# Patient Record
Sex: MALE | Race: WHITE | NOT HISPANIC OR LATINO | Employment: OTHER | URBAN - METROPOLITAN AREA
[De-identification: names, ages, dates, MRNs, and addresses within clinical notes are randomized per-mention and may not be internally consistent; named-entity substitution may affect disease eponyms.]

---

## 2017-03-21 ENCOUNTER — TELEPHONE (OUTPATIENT)
Dept: MEDICAL GROUP | Facility: MEDICAL CENTER | Age: 67
End: 2017-03-21

## 2017-03-21 NOTE — TELEPHONE ENCOUNTER
Left message for patient to call back regarding NP pre-visit planning. Please transfer call to [phone].  NP packet emailed to patient

## 2017-03-31 ENCOUNTER — OFFICE VISIT (OUTPATIENT)
Dept: MEDICAL GROUP | Facility: MEDICAL CENTER | Age: 67
End: 2017-03-31
Payer: MEDICARE

## 2017-03-31 VITALS
TEMPERATURE: 97.2 F | HEIGHT: 72 IN | DIASTOLIC BLOOD PRESSURE: 64 MMHG | RESPIRATION RATE: 16 BRPM | BODY MASS INDEX: 28.17 KG/M2 | HEART RATE: 64 BPM | SYSTOLIC BLOOD PRESSURE: 122 MMHG | OXYGEN SATURATION: 94 % | WEIGHT: 208 LBS

## 2017-03-31 DIAGNOSIS — N52.01 ERECTILE DYSFUNCTION DUE TO ARTERIAL INSUFFICIENCY: ICD-10-CM

## 2017-03-31 DIAGNOSIS — Z85.828 HISTORY OF SKIN CANCER: ICD-10-CM

## 2017-03-31 DIAGNOSIS — H91.93 HEARING LOSS, BILATERAL: ICD-10-CM

## 2017-03-31 DIAGNOSIS — E78.5 DYSLIPIDEMIA: ICD-10-CM

## 2017-03-31 DIAGNOSIS — Z12.5 SCREENING PSA (PROSTATE SPECIFIC ANTIGEN): ICD-10-CM

## 2017-03-31 DIAGNOSIS — Z23 NEED FOR PNEUMOCOCCAL VACCINE: ICD-10-CM

## 2017-03-31 DIAGNOSIS — R20.0 NUMBNESS OF FOOT: ICD-10-CM

## 2017-03-31 DIAGNOSIS — I49.9 IRREGULAR HEART RATE: ICD-10-CM

## 2017-03-31 PROCEDURE — 4040F PNEUMOC VAC/ADMIN/RCVD: CPT | Performed by: INTERNAL MEDICINE

## 2017-03-31 PROCEDURE — G8484 FLU IMMUNIZE NO ADMIN: HCPCS | Performed by: INTERNAL MEDICINE

## 2017-03-31 PROCEDURE — G8419 CALC BMI OUT NRM PARAM NOF/U: HCPCS | Performed by: INTERNAL MEDICINE

## 2017-03-31 PROCEDURE — 90670 PCV13 VACCINE IM: CPT | Performed by: INTERNAL MEDICINE

## 2017-03-31 PROCEDURE — 99204 OFFICE O/P NEW MOD 45 MIN: CPT | Mod: 25 | Performed by: INTERNAL MEDICINE

## 2017-03-31 PROCEDURE — 1101F PT FALLS ASSESS-DOCD LE1/YR: CPT | Performed by: INTERNAL MEDICINE

## 2017-03-31 PROCEDURE — G0009 ADMIN PNEUMOCOCCAL VACCINE: HCPCS | Performed by: INTERNAL MEDICINE

## 2017-03-31 PROCEDURE — 93000 ELECTROCARDIOGRAM COMPLETE: CPT | Performed by: INTERNAL MEDICINE

## 2017-03-31 PROCEDURE — G8432 DEP SCR NOT DOC, RNG: HCPCS | Performed by: INTERNAL MEDICINE

## 2017-03-31 PROCEDURE — 3017F COLORECTAL CA SCREEN DOC REV: CPT | Performed by: INTERNAL MEDICINE

## 2017-03-31 PROCEDURE — 1036F TOBACCO NON-USER: CPT | Performed by: INTERNAL MEDICINE

## 2017-03-31 ASSESSMENT — PATIENT HEALTH QUESTIONNAIRE - PHQ9: CLINICAL INTERPRETATION OF PHQ2 SCORE: 0

## 2017-03-31 NOTE — MR AVS SNAPSHOT
Tato Benjamin   3/31/2017 11:00 AM   Office Visit   MRN: [id number]    Department:  [address] [place] [address]   Dept Phone:  [phone]    Description:  Male : 1950   Provider:  Shravan Trent M.D.           Reason for Visit     Establish Care Hearing issues    Referral Needed ENT      Allergies as of 3/31/2017     No Known Allergies      You were diagnosed with     Dyslipidemia   [946439]       History of skin cancer   [678694]       Hearing loss, bilateral   [613381]       Screening PSA (prostate specific antigen)   [064278]       Numbness of foot   [619245]       Erectile dysfunction due to arterial insufficiency   [264735]       Need for pneumococcal vaccine   [679454]       Irregular heart rate   [838189]         Vital Signs     Blood Pressure Pulse Temperature Respirations Height Weight    122/64 mmHg 64 36.2 °C (97.2 °F) 16 1.829 m (6') 94.348 kg (208 lb)    Body Mass Index Oxygen Saturation Smoking Status             28.20 kg/m2 94% Never Smoker          Basic Information     Date Of Birth Sex Race Ethnicity Preferred Language    1950 Male White Non- English      Your appointments     2017  9:20 AM   Established Patient with Shravan Trent M.D.   [facility] [address]    [address] [facility] [address]  [facility] [address] [phone]           You will be receiving a confirmation call a few days before your appointment from our automated call confirmation system.              Problem List              ICD-10-CM Priority Class Noted - Resolved    Dyslipidemia E78.5   3/31/2017 - Present    History of skin cancer Z85.828   3/31/2017 - Present    Erectile dysfunction due to arterial insufficiency N52.01   3/31/2017 - Present    Numbness of foot R20.0   3/31/2017 - Present      Health Maintenance        Date Due Completion Dates    IMM ZOSTER VACCINE 2008 ---    IMM PNEUMOCOCCAL 65+ (ADULT) LOW/MEDIUM RISK SERIES (1 of 2 - PCV13) 2013 ---    IMM  INFLUENZA (1) 9/1/2016 9/30/2013    IMM DTaP/Tdap/Td Vaccine (2 - Td) 2/28/2022 2/29/2012    COLONOSCOPY 3/12/2024 3/12/2014 (Done)    Override on 3/12/2014: Done            Current Immunizations     13-VALENT PCV PREVNAR  Incomplete    Influenza Vaccine Quad Inj (Pf) 9/30/2013    Tdap Vaccine 2/29/2012      Below and/or attached are the medications your provider expects you to take. Review all of your home medications and newly ordered medications with your provider and/or pharmacist. Follow medication instructions as directed by your provider and/or pharmacist. Please keep your medication list with you and share with your provider. Update the information when medications are discontinued, doses are changed, or new medications (including over-the-counter products) are added; and carry medication information at all times in the event of emergency situations     Allergies:  No Known Allergies          Medications  Valid as of: March 31, 2017 - 11:51 AM    Generic Name Brand Name Tablet Size Instructions for use    Acetaminophen (Tab) TYLENOL 500 MG Take 500-1,000 mg by mouth every 6 hours as needed.        Aspirin (Tab) aspirin 81 MG Take 81 mg by mouth every day.        Coenzyme Q10   Take 75 mg by mouth every day.        Evening Primrose Oil (Cap) Evening Primrose Oil 500 MG Take  by mouth every day.        Multiple Vitamins-Minerals   Take  by mouth every day.        Omega-3 Fatty Acids (Cap) Fish Oil 1200 MG Take  by mouth every day.        Psyllium   Take  by mouth every day.        Simvastatin (Tab) ZOCOR 40 MG Take 40 mg by mouth every evening.        .                 Medicines prescribed today were sent to:     [facility]/PHARMACY #[id number] - [place] - [address]    [address] [place] [address]    Phone: [phone] Fax: [phone]    Open 24 Hours?: No      Medication refill instructions:       If your prescription bottle indicates you have medication refills left, it is not necessary to call your  provider’s office. Please contact your pharmacy and they will refill your medication.    If your prescription bottle indicates you do not have any refills left, you may request refills at any time through one of the following ways: The online [employer] system (except Urgent Care), by calling your provider’s office, or by asking your pharmacy to contact your provider’s office with a refill request. Medication refills are processed only during regular business hours and may not be available until the next business day. Your provider may request additional information or to have a follow-up visit with you prior to refilling your medication.   *Please Note: Medication refills are assigned a new Rx number when refilled electronically. Your pharmacy may indicate that no refills were authorized even though a new prescription for the same medication is available at the pharmacy. Please request the medicine by name with the pharmacy before contacting your provider for a refill.        Your To Do List     Future Labs/Procedures Complete By Expires    COMP METABOLIC PANEL  As directed 4/1/2018    FOLATE  As directed 4/1/2018    LIPID PROFILE  As directed 4/1/2018    PROSTATE SPECIFIC AG SCREENING  As directed 4/1/2018    TSH  As directed 4/1/2018    VITAMIN B12  As directed 4/1/2018      Referral     A referral request has been sent to our patient care coordination department. Please allow 3-5 business days for us to process this request and contact you either by phone or mail. If you do not hear from us by the 5th business day, please call us at [phone].           [employer] Access Code: Activation code not generated  Current [employer] Status: Active

## 2017-03-31 NOTE — PROGRESS NOTES
CC: Establishing care multiple issues    HPI:   Tato presents today with the following.    1. Dyslipidemia  Family history of coronary artery disease maintained on a statin not had blood work in over 2 years.    2. History of skin cancer  Distant past with no recurrence denying any skin changes.    3. Hearing loss, bilateral  Reports bilateral hearing loss but a sudden onset of decreased hearing in one side. He was evaluated by an audiologist but because the rapid decrease was suggested he be screened further. He denies any headaches or blurred vision but does report a decreased sense of smell. He does have some baseline allergies. Wife also reports severe bad breath without symptoms of reflux.    4. Screening PSA (prostate specific antigen)  He would like to have screening because of family history his father.    5. Numbness of foot  He does complain of mild bilateral foot numbness. Has not progressed since been present for several years. He still has feeling but has lost some light touch. Denies any pain in his legs with ambulation.    6. Erectile dysfunction due to arterial insufficiency  He has tried Viagra in the past which is been helpful. Reports libido is maintained and no depressive symptoms. Denies any chest pain or shortness breath.    7. Need for pneumococcal vaccine      8. Irregular heart rate  Heart rate slightly irregular on exam and slow but again no chest pain or palpitations.      Patient Active Problem List    Diagnosis Date Noted   • Dyslipidemia 03/31/2017   • History of skin cancer 03/31/2017   • Erectile dysfunction due to arterial insufficiency 03/31/2017   • Numbness of foot 03/31/2017       Current Outpatient Prescriptions   Medication Sig Dispense Refill   • simvastatin (ZOCOR) 40 MG TABS Take 40 mg by mouth every evening.     • aspirin 81 MG tablet Take 81 mg by mouth every day.     • Omega-3 Fatty Acids (FISH OIL) 1200 MG CAPS Take  by mouth every day.     • Coenzyme Q10 (COQ10 PO)  Take 75 mg by mouth every day.     • Multiple Vitamins-Minerals (PRESERVISION AREDS 2 PO) Take  by mouth every day.     • Evening Primrose Oil 500 MG CAPS Take  by mouth every day.     • acetaminophen (TYLENOL) 500 MG TABS Take 500-1,000 mg by mouth every 6 hours as needed.     • Psyllium (METAMUCIL PO) Take  by mouth every day.       No current facility-administered medications for this visit.         Allergies as of 03/31/2017   • (No Known Allergies)        ROS: As per HPI.    /64 mmHg  Pulse 64  Temp(Src) 36.2 °C (97.2 °F)  Resp 16  Ht 1.829 m (6')  Wt 94.348 kg (208 lb)  BMI 28.20 kg/m2  SpO2 94%    Physical Exam:  Gen:         Alert and oriented, No apparent distress.  Neck:        No Lymphadenopathy or Bruits.  Lungs:     Clear to auscultation bilaterally  CV: Bradycardic and normal rhythm. No murmurs, rubs or gallops.               Ext:          No clubbing, cyanosis, edema.    EKG:   sinus bradycardia, no acute st-t wave changes.Assessment and Plan.   66 y.o. male with the following issues.    1. Dyslipidemia  Sending for recheck of cholesterol likely switch to Lipitor at next visit.  - COMP METABOLIC PANEL; Future  - LIPID PROFILE; Future    2. History of skin cancer  No further recurrence follow expectantly.    3. Hearing loss, bilateral  Given the sudden nature as well as decreased smell have referred to ENT.  - REFERRAL TO ENT    4. Screening PSA (prostate specific antigen)    - PROSTATE SPECIFIC AG SCREENING; Future    5. Numbness of foot  Setting for additional blood work.  - TSH; Future  - VITAMIN B12; Future  - FOLATE; Future    6. Erectile dysfunction due to arterial insufficiency  We'll await results of blood work he will investigate price considerations for prescription medications as Viagra is been helpful in the past will give him prescription.    7. Need for pneumococcal vaccine    - PNEUMOCOCCAL CONJUGATE VACCINE 13-VALENT    8. Irregular heart rate  Irregular on exam but EKG  shows sinus bradycardia with no irregularities.  - EKG

## 2017-04-05 ENCOUNTER — HOSPITAL ENCOUNTER (OUTPATIENT)
Dept: LAB | Facility: MEDICAL CENTER | Age: 67
End: 2017-04-05
Attending: INTERNAL MEDICINE
Payer: MEDICARE

## 2017-04-05 DIAGNOSIS — R20.0 NUMBNESS OF FOOT: ICD-10-CM

## 2017-04-05 DIAGNOSIS — Z12.5 SCREENING PSA (PROSTATE SPECIFIC ANTIGEN): ICD-10-CM

## 2017-04-05 DIAGNOSIS — E78.5 DYSLIPIDEMIA: ICD-10-CM

## 2017-04-05 LAB
ALBUMIN SERPL BCP-MCNC: 4.2 G/DL (ref 3.2–4.9)
ALBUMIN/GLOB SERPL: 1.4 G/DL
ALP SERPL-CCNC: 75 U/L (ref 30–99)
ALT SERPL-CCNC: 16 U/L (ref 2–50)
ANION GAP SERPL CALC-SCNC: 7 MMOL/L (ref 0–11.9)
AST SERPL-CCNC: 19 U/L (ref 12–45)
BILIRUB SERPL-MCNC: 0.8 MG/DL (ref 0.1–1.5)
BUN SERPL-MCNC: 20 MG/DL (ref 8–22)
CALCIUM SERPL-MCNC: 9 MG/DL (ref 8.5–10.5)
CHLORIDE SERPL-SCNC: 106 MMOL/L (ref 96–112)
CHOLEST SERPL-MCNC: 204 MG/DL (ref 100–199)
CO2 SERPL-SCNC: 24 MMOL/L (ref 20–33)
CREAT SERPL-MCNC: 0.82 MG/DL (ref 0.5–1.4)
FOLATE SERPL-MCNC: 14.7 NG/ML
GFR SERPL CREATININE-BSD FRML MDRD: >60 ML/MIN/1.73 M 2
GLOBULIN SER CALC-MCNC: 2.9 G/DL (ref 1.9–3.5)
GLUCOSE SERPL-MCNC: 93 MG/DL (ref 65–99)
HDLC SERPL-MCNC: 36 MG/DL
LDLC SERPL CALC-MCNC: 137 MG/DL
POTASSIUM SERPL-SCNC: 4.2 MMOL/L (ref 3.6–5.5)
PROT SERPL-MCNC: 7.1 G/DL (ref 6–8.2)
PSA SERPL-MCNC: 3.41 NG/ML (ref 0–4)
SODIUM SERPL-SCNC: 137 MMOL/L (ref 135–145)
TRIGL SERPL-MCNC: 157 MG/DL (ref 0–149)
TSH SERPL DL<=0.005 MIU/L-ACNC: 1.75 UIU/ML (ref 0.3–3.7)
VIT B12 SERPL-MCNC: 173 PG/ML (ref 211–911)

## 2017-04-05 PROCEDURE — 84443 ASSAY THYROID STIM HORMONE: CPT

## 2017-04-05 PROCEDURE — 80053 COMPREHEN METABOLIC PANEL: CPT

## 2017-04-05 PROCEDURE — 82746 ASSAY OF FOLIC ACID SERUM: CPT

## 2017-04-05 PROCEDURE — 80061 LIPID PANEL: CPT

## 2017-04-05 PROCEDURE — 36415 COLL VENOUS BLD VENIPUNCTURE: CPT

## 2017-04-05 PROCEDURE — 84153 ASSAY OF PSA TOTAL: CPT | Mod: GA

## 2017-04-05 PROCEDURE — 82607 VITAMIN B-12: CPT

## 2017-04-12 ENCOUNTER — TELEPHONE (OUTPATIENT)
Dept: MEDICAL GROUP | Facility: MEDICAL CENTER | Age: 67
End: 2017-04-12

## 2017-04-12 ENCOUNTER — OFFICE VISIT (OUTPATIENT)
Dept: MEDICAL GROUP | Facility: MEDICAL CENTER | Age: 67
End: 2017-04-12
Payer: MEDICARE

## 2017-04-12 VITALS
BODY MASS INDEX: 28.17 KG/M2 | OXYGEN SATURATION: 96 % | WEIGHT: 208 LBS | TEMPERATURE: 98.7 F | HEIGHT: 72 IN | RESPIRATION RATE: 16 BRPM | HEART RATE: 74 BPM | SYSTOLIC BLOOD PRESSURE: 122 MMHG | DIASTOLIC BLOOD PRESSURE: 78 MMHG

## 2017-04-12 DIAGNOSIS — Z00.00 INITIAL MEDICARE ANNUAL WELLNESS VISIT: ICD-10-CM

## 2017-04-12 DIAGNOSIS — Z85.828 HISTORY OF SKIN CANCER: ICD-10-CM

## 2017-04-12 DIAGNOSIS — E78.5 DYSLIPIDEMIA: ICD-10-CM

## 2017-04-12 DIAGNOSIS — E53.8 B12 DEFICIENCY: ICD-10-CM

## 2017-04-12 DIAGNOSIS — N52.01 ERECTILE DYSFUNCTION DUE TO ARTERIAL INSUFFICIENCY: ICD-10-CM

## 2017-04-12 PROBLEM — R20.0 NUMBNESS OF FOOT: Status: RESOLVED | Noted: 2017-03-31 | Resolved: 2017-04-12

## 2017-04-12 PROCEDURE — 4040F PNEUMOC VAC/ADMIN/RCVD: CPT | Performed by: INTERNAL MEDICINE

## 2017-04-12 PROCEDURE — 3017F COLORECTAL CA SCREEN DOC REV: CPT | Performed by: INTERNAL MEDICINE

## 2017-04-12 PROCEDURE — 96372 THER/PROPH/DIAG INJ SC/IM: CPT | Performed by: INTERNAL MEDICINE

## 2017-04-12 PROCEDURE — 99214 OFFICE O/P EST MOD 30 MIN: CPT | Mod: 25 | Performed by: INTERNAL MEDICINE

## 2017-04-12 PROCEDURE — G0439 PPPS, SUBSEQ VISIT: HCPCS | Performed by: INTERNAL MEDICINE

## 2017-04-12 PROCEDURE — 1101F PT FALLS ASSESS-DOCD LE1/YR: CPT | Performed by: INTERNAL MEDICINE

## 2017-04-12 PROCEDURE — G8419 CALC BMI OUT NRM PARAM NOF/U: HCPCS | Performed by: INTERNAL MEDICINE

## 2017-04-12 PROCEDURE — G8510 SCR DEP NEG, NO PLAN REQD: HCPCS | Performed by: INTERNAL MEDICINE

## 2017-04-12 PROCEDURE — 1036F TOBACCO NON-USER: CPT | Performed by: INTERNAL MEDICINE

## 2017-04-12 RX ORDER — UBIDECARENONE 75 MG
100 CAPSULE ORAL DAILY
Qty: 30 TAB | Refills: 6 | Status: SHIPPED | OUTPATIENT
Start: 2017-04-12 | End: 2017-04-12

## 2017-04-12 RX ORDER — CYANOCOBALAMIN 1000 UG/ML
1000 INJECTION, SOLUTION INTRAMUSCULAR; SUBCUTANEOUS ONCE
Status: COMPLETED | OUTPATIENT
Start: 2017-04-12 | End: 2017-04-12

## 2017-04-12 RX ORDER — ATORVASTATIN CALCIUM 40 MG/1
40 TABLET, FILM COATED ORAL DAILY
Qty: 90 TAB | Refills: 3 | Status: SHIPPED | OUTPATIENT
Start: 2017-04-12 | End: 2018-03-03 | Stop reason: SDUPTHER

## 2017-04-12 RX ORDER — SILDENAFIL 100 MG/1
100 TABLET, FILM COATED ORAL PRN
Qty: 18 TAB | Refills: 3 | Status: SHIPPED
Start: 2017-04-12 | End: 2020-10-07 | Stop reason: SDUPTHER

## 2017-04-12 RX ADMIN — CYANOCOBALAMIN 1000 MCG: 1000 INJECTION, SOLUTION INTRAMUSCULAR; SUBCUTANEOUS at 10:07

## 2017-04-12 ASSESSMENT — PATIENT HEALTH QUESTIONNAIRE - PHQ9: CLINICAL INTERPRETATION OF PHQ2 SCORE: 0

## 2017-04-12 NOTE — PROGRESS NOTES
CC: Follow-up blood work due for wellness examination.    HPI:   Tato presents today with the following.    1. Initial Medicare annual wellness visit  Screenings performed below reports has advanced directives at home and will bring a copy to office.    2. Dyslipidemia  Maintain on simvastatin cholesterol shows an LDL of 137 with an HDL of 39. He tolerates medication well without myalgias.    3. Erectile dysfunction due to arterial insufficiency  He does suffer from erectile dysfunction is interested in Viagra. Price is too expensive so would like to do a mail away. He denies any chest pain or shortness breath with activity and no edema.    4. History of skin cancer  Followed regularly by dermatology denies any new skin lesions 2.    5. B12 deficiency  Was complaining of some decreased sensation in the feet with light touch. He was sent for blood work and found to have a low B-12 at 173. He denies any GI issues and does not report any excessive fatigue.  - Cyanocobalamin      Depression Screening    Little interest or pleasure in doing things?  0 - not at all  Feeling down, depressed , or hopeless? 0 - not at all  Trouble falling or staying asleep, or sleeping too much?     Feeling tired or having little energy?     Poor appetite or overeating?     Feeling bad about yourself - or that you are a failure or have let yourself or your family down?    Trouble concentrating on things, such as reading the newspaper or watching television?    Moving or speaking so slowly that other people could have noticed.  Or the opposite - being so fidgety or restless that you have been moving around a lot more than usual?     Thoughts that you would be better off dead, or of hurting yourself?     Patient Health Questionnaire Score:    If depressive symptoms identified deferred to follow up visit unless specifically addressed in assessment and plan.      Screening for Cognitive Impairment    Three Minute Recall (banana, sunrise,  fence)  3/3    Draw clock face with all 12 numbers set to the hand to show 10 minures past 11 o'clock  1 5/5  If cognitive concerns identified deferred to follow up visit unless specifically addressed in assessment and plan.    Fall Risk Assessment    Has the patient had two or more falls in the last year or any fall with injury in the last year?  No  If Fall Risk identified deferred to follow up visit unless specifically addressed in assessment and plan.    Safety Assessment    Throw rugs on floor.  No  Handrails on all stairs.  No  Good lighting in all hallways.  Yes  Difficulty hearing.  Yes  Patient counseled about all safety risks that were identified.    Functional Assessment ADLs    Are there any barriers preventing you from cooking for yourself or meeting nutritional needs?  No.    Are there any barriers preventing you from driving safely or obtaining transportation?  No.    Are there any barriers preventing you from using a telephone or calling for help?  No.    Are there any barriers preventing you from shopping?  No.    Are there any barriers preventing you from taking care of your own finances?  No.    Are there any barriers preventing you from managing your medications?  No.    Are currently engaing any exercise or physical activity?  Yes.       Health Maintenance Summary                IMM ZOSTER VACCINE Overdue 12/21/2008     IMM PNEUMOCOCCAL 65+ (ADULT) LOW/MEDIUM RISK SERIES Next Due 3/31/2018      Done 3/31/2017 Imm Admin: Pneumococcal Conjugate Vaccine (Prevnar/PCV-13)    IMM DTaP/Tdap/Td Vaccine Next Due 2/28/2022      Done 2/29/2012 Imm Admin: Tdap Vaccine    COLONOSCOPY Next Due 3/12/2024      Done 3/12/2014           Patient Care Team:  Shravan Trent M.D. as PCP - General (Internal Medicine)  Ty Simons M.D. as Consulting Physician (Gastroenterology)  Sunita Jo M.D. as Consulting Physician (Dermatology)      Patient Active Problem List    Diagnosis Date Noted   • B12 deficiency  04/12/2017   • Dyslipidemia 03/31/2017   • History of skin cancer 03/31/2017   • Erectile dysfunction due to arterial insufficiency 03/31/2017       Current Outpatient Prescriptions   Medication Sig Dispense Refill   • cyanocobalamin (VITAMIN B-12) 100 MCG Tab Take 1 Tab by mouth every day. 30 Tab 6   • atorvastatin (LIPITOR) 40 MG Tab Take 1 Tab by mouth every day. 90 Tab 3   • sildenafil citrate (VIAGRA) 100 MG tablet Take 1 Tab by mouth as needed for Erectile Dysfunction. 18 Tab 3   • aspirin 81 MG tablet Take 81 mg by mouth every day.     • Omega-3 Fatty Acids (FISH OIL) 1200 MG CAPS Take  by mouth every day.     • Coenzyme Q10 (COQ10 PO) Take 75 mg by mouth every day.     • Multiple Vitamins-Minerals (PRESERVISION AREDS 2 PO) Take  by mouth every day.     • Evening Primrose Oil 500 MG CAPS Take  by mouth every day.     • acetaminophen (TYLENOL) 500 MG TABS Take 500-1,000 mg by mouth every 6 hours as needed.     • Psyllium (METAMUCIL PO) Take  by mouth every day.       Current Facility-Administered Medications   Medication Dose Route Frequency Provider Last Rate Last Dose   • cyanocobalamin (VITAMIN B-12) injection 1,000 mcg  1,000 mcg Intramuscular Once Shravan Trent M.D.             Allergies as of 04/12/2017   • (No Known Allergies)        ROS: As per HPI.    /78 mmHg  Pulse 74  Temp(Src) 37.1 °C (98.7 °F)  Resp 16  Ht 1.829 m (6')  Wt 94.348 kg (208 lb)  BMI 28.20 kg/m2  SpO2 96%    Physical Exam:  Gen:         Alert and oriented, No apparent distress.  Neck:        No Lymphadenopathy or Bruits.  Lungs:     Clear to auscultation bilaterally  CV:          Regular rate and rhythm. No murmurs, rubs or gallops.  Abd:         Soft non tender, non distended. Normal active bowel sounds.  No  Hepatosplenomegaly, No pulsatile masses.                   Ext:          No clubbing, cyanosis, edema.      Assessment and Plan.   66 y.o. male with the following issues.    1. Initial Medicare annual wellness  visit  Discussed healthy lifestyle habits as well as screening regimens.  - Initial Wellness Visit - Includes PPPS ()    2. Dyslipidemia  Switching to atorvastatin 40 mg recheck 3 months caution about side effects.  - atorvastatin (LIPITOR) 40 MG Tab; Take 1 Tab by mouth every day.  Dispense: 90 Tab; Refill: 3  - COMP METABOLIC PANEL; Future  - LIPID PROFILE; Future    3. Erectile dysfunction due to arterial insufficiency  Have given a prescription of Viagra. Cautioned about side effects  - sildenafil citrate (VIAGRA) 100 MG tablet; Take 1 Tab by mouth as needed for Erectile Dysfunction.  Dispense: 18 Tab; Refill: 3    4. History of skin cancer  Follow along with dermatology.  - Initial Wellness Visit - Includes PPPS ()    5. B12 deficiency  One time injection of B-12 and office have placed on oral replacement recheck in 3 months. If still low will turn to regular injections.  - VITAMIN B12; Future  - cyanocobalamin (VITAMIN B-12) 100 MCG Tab; Take 1 Tab by mouth every day.  Dispense: 30 Tab; Refill: 6  - cyanocobalamin (VITAMIN B-12) injection 1,000 mcg; 1 mL by Intramuscular route Once.

## 2017-04-12 NOTE — MR AVS SNAPSHOT
Tato BRY Benjamin   2017 9:20 AM   Office Visit   MRN: [id number]    Department:  [address] [place] [address]   Dept Phone:  [phone]    Description:  Male : 1950   Provider:  Shravan Trent M.D.           Reason for Visit     Annual Exam Labs      Allergies as of 2017     No Known Allergies      You were diagnosed with     Initial Medicare annual wellness visit   [5419024]       Dyslipidemia   [881171]       Erectile dysfunction due to arterial insufficiency   [103088]       History of skin cancer   [164615]       B12 deficiency   [817389]         Vital Signs     Blood Pressure Pulse Temperature Respirations Height Weight    122/78 mmHg 74 37.1 °C (98.7 °F) 16 1.829 m (6') 94.348 kg (208 lb)    Body Mass Index Oxygen Saturation Smoking Status             28.20 kg/m2 96% Never Smoker          Basic Information     Date Of Birth Sex Race Ethnicity Preferred Language    1950 Male White Non- English      Problem List              ICD-10-CM Priority Class Noted - Resolved    Dyslipidemia E78.5   3/31/2017 - Present    History of skin cancer Z85.828   3/31/2017 - Present    Erectile dysfunction due to arterial insufficiency N52.01   3/31/2017 - Present    B12 deficiency E53.8   2017 - Present      Health Maintenance        Date Due Completion Dates    IMM ZOSTER VACCINE 2008 ---    IMM PNEUMOCOCCAL 65+ (ADULT) LOW/MEDIUM RISK SERIES (2 of 2 - PPSV23) 3/31/2018 3/31/2017    IMM DTaP/Tdap/Td Vaccine (2 - Td) 2022    COLONOSCOPY 3/12/2024 3/12/2014 (Done)    Override on 3/12/2014: Done            Current Immunizations     13-VALENT PCV PREVNAR 3/31/2017 11:56 AM    Influenza Vaccine Quad Inj (Pf) 2013    Tdap Vaccine 2012      Below and/or attached are the medications your provider expects you to take. Review all of your home medications and newly ordered medications with your provider and/or pharmacist. Follow medication instructions as directed by your  provider and/or pharmacist. Please keep your medication list with you and share with your provider. Update the information when medications are discontinued, doses are changed, or new medications (including over-the-counter products) are added; and carry medication information at all times in the event of emergency situations     Allergies:  No Known Allergies          Medications  Valid as of: April 12, 2017 - 10:00 AM    Generic Name Brand Name Tablet Size Instructions for use    Acetaminophen (Tab) TYLENOL 500 MG Take 500-1,000 mg by mouth every 6 hours as needed.        Aspirin (Tab) aspirin 81 MG Take 81 mg by mouth every day.        Atorvastatin Calcium (Tab) LIPITOR 40 MG Take 1 Tab by mouth every day.        Coenzyme Q10   Take 75 mg by mouth every day.        Cyanocobalamin (Tab) VITAMIN B-12 100 MCG Take 1 Tab by mouth every day.        Evening Primrose Oil (Cap) Evening Primrose Oil 500 MG Take  by mouth every day.        Multiple Vitamins-Minerals   Take  by mouth every day.        Omega-3 Fatty Acids (Cap) Fish Oil 1200 MG Take  by mouth every day.        Psyllium   Take  by mouth every day.        Sildenafil Citrate (Tab) VIAGRA 100 MG Take 1 Tab by mouth as needed for Erectile Dysfunction.        .                 Medicines prescribed today were sent to:     [facility]/PHARMACY #[id number] - [place], [place] - [address]    [address] [facility]. [place] [address]    Phone: [phone] Fax: [phone]    Open 24 Hours?: No      Medication refill instructions:       If your prescription bottle indicates you have medication refills left, it is not necessary to call your provider’s office. Please contact your pharmacy and they will refill your medication.    If your prescription bottle indicates you do not have any refills left, you may request refills at any time through one of the following ways: The online [employer] system (except Urgent Care), by calling your provider’s office, or by asking your pharmacy to contact  your provider’s office with a refill request. Medication refills are processed only during regular business hours and may not be available until the next business day. Your provider may request additional information or to have a follow-up visit with you prior to refilling your medication.   *Please Note: Medication refills are assigned a new Rx number when refilled electronically. Your pharmacy may indicate that no refills were authorized even though a new prescription for the same medication is available at the pharmacy. Please request the medicine by name with the pharmacy before contacting your provider for a refill.        Your To Do List     Future Labs/Procedures Complete By Expires    COMP METABOLIC PANEL  As directed 4/13/2018    LIPID PROFILE  As directed 4/13/2018    VITAMIN B12  As directed 4/13/2018         MyChart Access Code: Activation code not generated  Current [employer] Status: Active

## 2017-04-12 NOTE — TELEPHONE ENCOUNTER
Phone Number Called: [phone] or [phone]       Message: Left VM for the patient to call back to give doctor message regarding RX.    Left Message for patient to call back: N\A

## 2017-04-12 NOTE — TELEPHONE ENCOUNTER
Pharmacy is requesting new script for B12 to be sent, they stated the lowest dose available is 500 mcg.

## 2017-07-21 ENCOUNTER — HOSPITAL ENCOUNTER (OUTPATIENT)
Dept: LAB | Facility: MEDICAL CENTER | Age: 67
End: 2017-07-21
Attending: INTERNAL MEDICINE
Payer: MEDICARE

## 2017-07-21 DIAGNOSIS — E53.8 B12 DEFICIENCY: ICD-10-CM

## 2017-07-21 DIAGNOSIS — E78.5 DYSLIPIDEMIA: ICD-10-CM

## 2017-07-21 LAB
ALBUMIN SERPL BCP-MCNC: 4.2 G/DL (ref 3.2–4.9)
ALBUMIN/GLOB SERPL: 1.7 G/DL
ALP SERPL-CCNC: 89 U/L (ref 30–99)
ALT SERPL-CCNC: 12 U/L (ref 2–50)
ANION GAP SERPL CALC-SCNC: 7 MMOL/L (ref 0–11.9)
AST SERPL-CCNC: 17 U/L (ref 12–45)
BILIRUB SERPL-MCNC: 0.8 MG/DL (ref 0.1–1.5)
BUN SERPL-MCNC: 23 MG/DL (ref 8–22)
CALCIUM SERPL-MCNC: 9.3 MG/DL (ref 8.5–10.5)
CHLORIDE SERPL-SCNC: 105 MMOL/L (ref 96–112)
CHOLEST SERPL-MCNC: 155 MG/DL (ref 100–199)
CO2 SERPL-SCNC: 25 MMOL/L (ref 20–33)
CREAT SERPL-MCNC: 0.97 MG/DL (ref 0.5–1.4)
GFR SERPL CREATININE-BSD FRML MDRD: >60 ML/MIN/1.73 M 2
GLOBULIN SER CALC-MCNC: 2.5 G/DL (ref 1.9–3.5)
GLUCOSE SERPL-MCNC: 87 MG/DL (ref 65–99)
HDLC SERPL-MCNC: 34 MG/DL
LDLC SERPL CALC-MCNC: 103 MG/DL
POTASSIUM SERPL-SCNC: 4.2 MMOL/L (ref 3.6–5.5)
PROT SERPL-MCNC: 6.7 G/DL (ref 6–8.2)
SODIUM SERPL-SCNC: 137 MMOL/L (ref 135–145)
TRIGL SERPL-MCNC: 90 MG/DL (ref 0–149)
VIT B12 SERPL-MCNC: 522 PG/ML (ref 211–911)

## 2017-07-21 PROCEDURE — 80053 COMPREHEN METABOLIC PANEL: CPT

## 2017-07-21 PROCEDURE — 80061 LIPID PANEL: CPT

## 2017-07-21 PROCEDURE — 36415 COLL VENOUS BLD VENIPUNCTURE: CPT

## 2017-07-21 PROCEDURE — 82607 VITAMIN B-12: CPT

## 2018-03-05 ENCOUNTER — OFFICE VISIT (OUTPATIENT)
Dept: MEDICAL GROUP | Facility: MEDICAL CENTER | Age: 68
End: 2018-03-05
Payer: MEDICARE

## 2018-03-05 VITALS
HEART RATE: 60 BPM | RESPIRATION RATE: 16 BRPM | HEIGHT: 72 IN | SYSTOLIC BLOOD PRESSURE: 126 MMHG | WEIGHT: 206 LBS | OXYGEN SATURATION: 96 % | TEMPERATURE: 97.4 F | DIASTOLIC BLOOD PRESSURE: 62 MMHG | BODY MASS INDEX: 27.9 KG/M2

## 2018-03-05 DIAGNOSIS — E78.5 DYSLIPIDEMIA: ICD-10-CM

## 2018-03-05 DIAGNOSIS — K40.20 BILATERAL INGUINAL HERNIA WITHOUT OBSTRUCTION OR GANGRENE, RECURRENCE NOT SPECIFIED: ICD-10-CM

## 2018-03-05 DIAGNOSIS — E53.8 B12 DEFICIENCY: ICD-10-CM

## 2018-03-05 PROCEDURE — 99214 OFFICE O/P EST MOD 30 MIN: CPT | Performed by: INTERNAL MEDICINE

## 2018-03-05 RX ORDER — ATORVASTATIN CALCIUM 40 MG/1
40 TABLET, FILM COATED ORAL DAILY
Qty: 90 TAB | Refills: 3 | Status: SHIPPED | OUTPATIENT
Start: 2018-03-05 | End: 2019-08-08 | Stop reason: SDUPTHER

## 2018-03-05 NOTE — PROGRESS NOTES
CC: Inguinal swelling coming due for blood work.    HPI:   Tato presents today with the following.    1. Dyslipidemia  Maintain on statin without a myalgia controlled cholesterol last check.    2. B12 deficiency  Placed on B-12 medication is not had a recheck. Denies excessive fatigue    3. Bilateral inguinal hernia without obstruction or gangrene, recurrence not specified  Main complaint is bilateral inguinal swelling. He does notice an occasional discomfort that's one out of 10 in intensity. No changes to bowel or bladder. He denies any progressive increase in swelling. Noticeable left greater than right.      Patient Active Problem List    Diagnosis Date Noted   • Bilateral inguinal hernia without obstruction or gangrene 03/05/2018   • B12 deficiency 04/12/2017   • Dyslipidemia 03/31/2017   • History of skin cancer 03/31/2017   • Erectile dysfunction due to arterial insufficiency 03/31/2017       Current Outpatient Prescriptions   Medication Sig Dispense Refill   • atorvastatin (LIPITOR) 40 MG Tab Take 1 Tab by mouth every day. 90 Tab 3   • cyanocobalamin ([facility] B-12) 500 MCG tablet Take 1 Tab by mouth every day. 30 Tab 11   • aspirin 81 MG tablet Take 81 mg by mouth every day.     • Omega-3 Fatty Acids (FISH OIL) 1200 MG CAPS Take  by mouth every day.     • Multiple Vitamins-Minerals (PRESERVISION AREDS 2 PO) Take  by mouth every day.     • acetaminophen (TYLENOL) 500 MG TABS Take 500-1,000 mg by mouth every 6 hours as needed.     • Psyllium (METAMUCIL PO) Take  by mouth every day.     • sildenafil citrate (VIAGRA) 100 MG tablet Take 1 Tab by mouth as needed for Erectile Dysfunction. (Patient not taking: Reported on 3/5/2018) 18 Tab 3   • Coenzyme Q10 (COQ10 PO) Take 75 mg by mouth every day.     • Evening Primrose Oil 500 MG CAPS Take  by mouth every day.       No current facility-administered medications for this visit.          Allergies as of 03/05/2018   • (No Known Allergies)        ROS: Denies Chest  pain, SOB, LE edema.    /62   Pulse 60   Temp 36.3 °C (97.4 °F)   Resp 16   Ht 1.829 m (6')   Wt 93.4 kg (206 lb)   SpO2 96%   BMI 27.94 kg/m²      Physical Exam:  Gen:         Alert and oriented, No apparent distress.  Neck:        No Lymphadenopathy or Bruits.  Lungs:     Clear to auscultation bilaterally  CV:          Regular rate and rhythm. No murmurs, rubs or gallops.   ABD: Bilateral reducible inguinal hernias.                     Ext:          No clubbing, cyanosis, edema.      Assessment and Plan.   67 y.o. male with the following issues.    1. Dyslipidemia  Refilled medication given a lab slip to repeat testing.  - COMP METABOLIC PANEL; Future  - LIPID PROFILE; Future  - atorvastatin (LIPITOR) 40 MG Tab; Take 1 Tab by mouth every day.  Dispense: 90 Tab; Refill: 3    2. B12 deficiency  Doing well from a fatigue standpoint rechecking B-12  - VITAMIN B12; Future    3. Bilateral inguinal hernia without obstruction or gangrene, recurrence not specified  Discussion about interventions he would like to hold off for now we'll manage expectantly becomes painful.discussed what to look for in terms of emergent strangulation.

## 2018-10-29 ENCOUNTER — HOSPITAL ENCOUNTER (OUTPATIENT)
Dept: LAB | Facility: MEDICAL CENTER | Age: 68
End: 2018-10-29
Attending: INTERNAL MEDICINE
Payer: MEDICARE

## 2018-10-29 DIAGNOSIS — E53.8 B12 DEFICIENCY: ICD-10-CM

## 2018-10-29 DIAGNOSIS — E78.5 DYSLIPIDEMIA: ICD-10-CM

## 2018-10-29 LAB
ALBUMIN SERPL BCP-MCNC: 4.5 G/DL (ref 3.2–4.9)
ALBUMIN/GLOB SERPL: 1.8 G/DL
ALP SERPL-CCNC: 85 U/L (ref 30–99)
ALT SERPL-CCNC: 12 U/L (ref 2–50)
ANION GAP SERPL CALC-SCNC: 8 MMOL/L (ref 0–11.9)
AST SERPL-CCNC: 18 U/L (ref 12–45)
BILIRUB SERPL-MCNC: 0.7 MG/DL (ref 0.1–1.5)
BUN SERPL-MCNC: 21 MG/DL (ref 8–22)
CALCIUM SERPL-MCNC: 9.6 MG/DL (ref 8.5–10.5)
CHLORIDE SERPL-SCNC: 105 MMOL/L (ref 96–112)
CHOLEST SERPL-MCNC: 164 MG/DL (ref 100–199)
CO2 SERPL-SCNC: 25 MMOL/L (ref 20–33)
CREAT SERPL-MCNC: 0.85 MG/DL (ref 0.5–1.4)
FASTING STATUS PATIENT QL REPORTED: NORMAL
GLOBULIN SER CALC-MCNC: 2.5 G/DL (ref 1.9–3.5)
GLUCOSE SERPL-MCNC: 98 MG/DL (ref 65–99)
HDLC SERPL-MCNC: 42 MG/DL
LDLC SERPL CALC-MCNC: 104 MG/DL
POTASSIUM SERPL-SCNC: 4.4 MMOL/L (ref 3.6–5.5)
PROT SERPL-MCNC: 7 G/DL (ref 6–8.2)
SODIUM SERPL-SCNC: 138 MMOL/L (ref 135–145)
TRIGL SERPL-MCNC: 88 MG/DL (ref 0–149)
VIT B12 SERPL-MCNC: 803 PG/ML (ref 211–911)

## 2018-10-29 PROCEDURE — 36415 COLL VENOUS BLD VENIPUNCTURE: CPT

## 2018-10-29 PROCEDURE — 80053 COMPREHEN METABOLIC PANEL: CPT

## 2018-10-29 PROCEDURE — 82607 VITAMIN B-12: CPT

## 2018-10-29 PROCEDURE — 80061 LIPID PANEL: CPT

## 2018-10-30 RX ORDER — ATORVASTATIN CALCIUM 40 MG/1
TABLET, FILM COATED ORAL
Qty: 90 TAB | Refills: 1 | Status: SHIPPED | OUTPATIENT
Start: 2018-10-30 | End: 2019-08-08

## 2019-08-08 ENCOUNTER — HOSPITAL ENCOUNTER (OUTPATIENT)
Dept: LAB | Facility: MEDICAL CENTER | Age: 69
End: 2019-08-08
Attending: INTERNAL MEDICINE
Payer: MEDICARE

## 2019-08-08 ENCOUNTER — OFFICE VISIT (OUTPATIENT)
Dept: MEDICAL GROUP | Facility: MEDICAL CENTER | Age: 69
End: 2019-08-08
Payer: MEDICARE

## 2019-08-08 VITALS
WEIGHT: 208 LBS | OXYGEN SATURATION: 98 % | HEIGHT: 72 IN | HEART RATE: 64 BPM | SYSTOLIC BLOOD PRESSURE: 130 MMHG | BODY MASS INDEX: 28.17 KG/M2 | DIASTOLIC BLOOD PRESSURE: 72 MMHG | TEMPERATURE: 97.4 F

## 2019-08-08 DIAGNOSIS — R35.0 BENIGN PROSTATIC HYPERPLASIA WITH URINARY FREQUENCY: ICD-10-CM

## 2019-08-08 DIAGNOSIS — N40.1 BENIGN PROSTATIC HYPERPLASIA WITH URINARY FREQUENCY: ICD-10-CM

## 2019-08-08 DIAGNOSIS — Z11.59 NEED FOR HEPATITIS C SCREENING TEST: ICD-10-CM

## 2019-08-08 DIAGNOSIS — E78.5 DYSLIPIDEMIA: ICD-10-CM

## 2019-08-08 DIAGNOSIS — E53.8 B12 DEFICIENCY: ICD-10-CM

## 2019-08-08 DIAGNOSIS — R19.6 HALITOSIS: ICD-10-CM

## 2019-08-08 DIAGNOSIS — Z00.00 MEDICARE ANNUAL WELLNESS VISIT, SUBSEQUENT: ICD-10-CM

## 2019-08-08 DIAGNOSIS — Z12.5 SCREENING PSA (PROSTATE SPECIFIC ANTIGEN): ICD-10-CM

## 2019-08-08 DIAGNOSIS — Z23 NEED FOR VACCINATION: ICD-10-CM

## 2019-08-08 LAB
ALBUMIN SERPL BCP-MCNC: 4.4 G/DL (ref 3.2–4.9)
ALBUMIN/GLOB SERPL: 1.6 G/DL
ALP SERPL-CCNC: 79 U/L (ref 30–99)
ALT SERPL-CCNC: 15 U/L (ref 2–50)
ANION GAP SERPL CALC-SCNC: 8 MMOL/L (ref 0–11.9)
AST SERPL-CCNC: 22 U/L (ref 12–45)
BILIRUB SERPL-MCNC: 0.9 MG/DL (ref 0.1–1.5)
BUN SERPL-MCNC: 18 MG/DL (ref 8–22)
CALCIUM SERPL-MCNC: 9.6 MG/DL (ref 8.5–10.5)
CHLORIDE SERPL-SCNC: 103 MMOL/L (ref 96–112)
CHOLEST SERPL-MCNC: 169 MG/DL (ref 100–199)
CO2 SERPL-SCNC: 29 MMOL/L (ref 20–33)
CREAT SERPL-MCNC: 0.96 MG/DL (ref 0.5–1.4)
GLOBULIN SER CALC-MCNC: 2.7 G/DL (ref 1.9–3.5)
GLUCOSE SERPL-MCNC: 97 MG/DL (ref 65–99)
HCV AB SER QL: NEGATIVE
HDLC SERPL-MCNC: 40 MG/DL
LDLC SERPL CALC-MCNC: 112 MG/DL
POTASSIUM SERPL-SCNC: 4.4 MMOL/L (ref 3.6–5.5)
PROT SERPL-MCNC: 7.1 G/DL (ref 6–8.2)
PSA SERPL-MCNC: 5.12 NG/ML (ref 0–4)
SODIUM SERPL-SCNC: 140 MMOL/L (ref 135–145)
TRIGL SERPL-MCNC: 86 MG/DL (ref 0–149)
VIT B12 SERPL-MCNC: 750 PG/ML (ref 211–911)

## 2019-08-08 PROCEDURE — 86803 HEPATITIS C AB TEST: CPT

## 2019-08-08 PROCEDURE — 36415 COLL VENOUS BLD VENIPUNCTURE: CPT

## 2019-08-08 PROCEDURE — G0009 ADMIN PNEUMOCOCCAL VACCINE: HCPCS | Performed by: INTERNAL MEDICINE

## 2019-08-08 PROCEDURE — G0439 PPPS, SUBSEQ VISIT: HCPCS | Performed by: INTERNAL MEDICINE

## 2019-08-08 PROCEDURE — 80053 COMPREHEN METABOLIC PANEL: CPT

## 2019-08-08 PROCEDURE — 80061 LIPID PANEL: CPT

## 2019-08-08 PROCEDURE — 90732 PPSV23 VACC 2 YRS+ SUBQ/IM: CPT | Performed by: INTERNAL MEDICINE

## 2019-08-08 PROCEDURE — 99214 OFFICE O/P EST MOD 30 MIN: CPT | Mod: 25 | Performed by: INTERNAL MEDICINE

## 2019-08-08 PROCEDURE — 82607 VITAMIN B-12: CPT

## 2019-08-08 PROCEDURE — 84153 ASSAY OF PSA TOTAL: CPT | Mod: GA

## 2019-08-08 RX ORDER — ATORVASTATIN CALCIUM 40 MG/1
40 TABLET, FILM COATED ORAL DAILY
Qty: 90 TAB | Refills: 3 | Status: SHIPPED | OUTPATIENT
Start: 2019-08-08 | End: 2020-09-07

## 2019-08-08 RX ORDER — TAMSULOSIN HYDROCHLORIDE 0.4 MG/1
0.4 CAPSULE ORAL
Qty: 90 CAP | Refills: 3 | Status: ON HOLD | OUTPATIENT
Start: 2019-08-08 | End: 2022-01-19

## 2019-08-08 ASSESSMENT — ENCOUNTER SYMPTOMS: GENERAL WELL-BEING: GOOD

## 2019-08-08 ASSESSMENT — PATIENT HEALTH QUESTIONNAIRE - PHQ9: CLINICAL INTERPRETATION OF PHQ2 SCORE: 0

## 2019-08-08 ASSESSMENT — ACTIVITIES OF DAILY LIVING (ADL): BATHING_REQUIRES_ASSISTANCE: 0

## 2020-09-07 DIAGNOSIS — E78.5 DYSLIPIDEMIA: ICD-10-CM

## 2020-09-07 RX ORDER — ATORVASTATIN CALCIUM 40 MG/1
TABLET, FILM COATED ORAL
Qty: 90 TAB | Refills: 3 | Status: SHIPPED | OUTPATIENT
Start: 2020-09-07 | End: 2020-10-07 | Stop reason: SDUPTHER

## 2020-10-07 ENCOUNTER — PATIENT MESSAGE (OUTPATIENT)
Dept: MEDICAL GROUP | Facility: MEDICAL CENTER | Age: 70
End: 2020-10-07

## 2020-10-07 DIAGNOSIS — N52.01 ERECTILE DYSFUNCTION DUE TO ARTERIAL INSUFFICIENCY: ICD-10-CM

## 2020-10-07 DIAGNOSIS — R35.0 BENIGN PROSTATIC HYPERPLASIA WITH URINARY FREQUENCY: ICD-10-CM

## 2020-10-07 DIAGNOSIS — E78.5 DYSLIPIDEMIA: ICD-10-CM

## 2020-10-07 DIAGNOSIS — N40.1 BENIGN PROSTATIC HYPERPLASIA WITH URINARY FREQUENCY: ICD-10-CM

## 2020-10-07 RX ORDER — SILDENAFIL 100 MG/1
100 TABLET, FILM COATED ORAL PRN
Qty: 18 TAB | Refills: 3 | Status: ON HOLD | OUTPATIENT
Start: 2020-10-07 | End: 2022-01-19

## 2020-10-07 RX ORDER — ATORVASTATIN CALCIUM 40 MG/1
40 TABLET, FILM COATED ORAL DAILY
Qty: 90 TAB | Refills: 1 | Status: SHIPPED | OUTPATIENT
Start: 2020-10-07 | End: 2023-01-01

## 2021-01-15 DIAGNOSIS — Z23 NEED FOR VACCINATION: ICD-10-CM

## 2021-01-23 ENCOUNTER — IMMUNIZATION (OUTPATIENT)
Dept: FAMILY PLANNING/WOMEN'S HEALTH CLINIC | Facility: IMMUNIZATION CENTER | Age: 71
End: 2021-01-23
Attending: INTERNAL MEDICINE
Payer: MEDICARE

## 2021-01-23 DIAGNOSIS — Z23 NEED FOR VACCINATION: ICD-10-CM

## 2021-01-23 DIAGNOSIS — Z23 ENCOUNTER FOR VACCINATION: Primary | ICD-10-CM

## 2021-01-23 PROCEDURE — 0001A PFIZER SARS-COV-2 VACCINE: CPT

## 2021-01-23 PROCEDURE — 91300 PFIZER SARS-COV-2 VACCINE: CPT

## 2021-02-13 ENCOUNTER — APPOINTMENT (OUTPATIENT)
Dept: FAMILY PLANNING/WOMEN'S HEALTH CLINIC | Facility: IMMUNIZATION CENTER | Age: 71
End: 2021-02-13
Attending: INTERNAL MEDICINE
Payer: MEDICARE

## 2021-02-13 ENCOUNTER — IMMUNIZATION (OUTPATIENT)
Dept: FAMILY PLANNING/WOMEN'S HEALTH CLINIC | Facility: IMMUNIZATION CENTER | Age: 71
End: 2021-02-13
Payer: MEDICARE

## 2021-02-13 DIAGNOSIS — Z23 ENCOUNTER FOR VACCINATION: Primary | ICD-10-CM

## 2021-02-13 PROCEDURE — 0002A PFIZER SARS-COV-2 VACCINE: CPT

## 2021-02-13 PROCEDURE — 91300 PFIZER SARS-COV-2 VACCINE: CPT

## 2021-07-12 ENCOUNTER — HOSPITAL ENCOUNTER (OUTPATIENT)
Dept: LAB | Facility: MEDICAL CENTER | Age: 71
End: 2021-07-12
Attending: FAMILY MEDICINE
Payer: MEDICARE

## 2021-07-12 LAB
ALBUMIN SERPL BCP-MCNC: 4.2 G/DL (ref 3.2–4.9)
ALBUMIN/GLOB SERPL: 1.6 G/DL
ALP SERPL-CCNC: 94 U/L (ref 30–99)
ALT SERPL-CCNC: 12 U/L (ref 2–50)
ANION GAP SERPL CALC-SCNC: 10 MMOL/L (ref 7–16)
AST SERPL-CCNC: 22 U/L (ref 12–45)
BASOPHILS # BLD AUTO: 0.6 % (ref 0–1.8)
BASOPHILS # BLD: 0.03 K/UL (ref 0–0.12)
BILIRUB SERPL-MCNC: 0.9 MG/DL (ref 0.1–1.5)
BUN SERPL-MCNC: 25 MG/DL (ref 8–22)
CALCIUM SERPL-MCNC: 9.3 MG/DL (ref 8.5–10.5)
CHLORIDE SERPL-SCNC: 104 MMOL/L (ref 96–112)
CHOLEST SERPL-MCNC: 177 MG/DL (ref 100–199)
CO2 SERPL-SCNC: 24 MMOL/L (ref 20–33)
CREAT SERPL-MCNC: 0.88 MG/DL (ref 0.5–1.4)
EOSINOPHIL # BLD AUTO: 0.06 K/UL (ref 0–0.51)
EOSINOPHIL NFR BLD: 1.2 % (ref 0–6.9)
ERYTHROCYTE [DISTWIDTH] IN BLOOD BY AUTOMATED COUNT: 46.2 FL (ref 35.9–50)
FASTING STATUS PATIENT QL REPORTED: NORMAL
GLOBULIN SER CALC-MCNC: 2.6 G/DL (ref 1.9–3.5)
GLUCOSE SERPL-MCNC: 95 MG/DL (ref 65–99)
HCT VFR BLD AUTO: 44.3 % (ref 42–52)
HDLC SERPL-MCNC: 41 MG/DL
HGB BLD-MCNC: 14.9 G/DL (ref 14–18)
IMM GRANULOCYTES # BLD AUTO: 0.01 K/UL (ref 0–0.11)
IMM GRANULOCYTES NFR BLD AUTO: 0.2 % (ref 0–0.9)
LDLC SERPL CALC-MCNC: 117 MG/DL
LYMPHOCYTES # BLD AUTO: 1.29 K/UL (ref 1–4.8)
LYMPHOCYTES NFR BLD: 24.8 % (ref 22–41)
MCH RBC QN AUTO: 32.5 PG (ref 27–33)
MCHC RBC AUTO-ENTMCNC: 33.6 G/DL (ref 33.7–35.3)
MCV RBC AUTO: 96.5 FL (ref 81.4–97.8)
MONOCYTES # BLD AUTO: 0.42 K/UL (ref 0–0.85)
MONOCYTES NFR BLD AUTO: 8.1 % (ref 0–13.4)
NEUTROPHILS # BLD AUTO: 3.39 K/UL (ref 1.82–7.42)
NEUTROPHILS NFR BLD: 65.1 % (ref 44–72)
NRBC # BLD AUTO: 0 K/UL
NRBC BLD-RTO: 0 /100 WBC
PLATELET # BLD AUTO: 189 K/UL (ref 164–446)
PMV BLD AUTO: 12.6 FL (ref 9–12.9)
POTASSIUM SERPL-SCNC: 4.1 MMOL/L (ref 3.6–5.5)
PROT SERPL-MCNC: 6.8 G/DL (ref 6–8.2)
PSA SERPL-MCNC: 7.82 NG/ML (ref 0–4)
RBC # BLD AUTO: 4.59 M/UL (ref 4.7–6.1)
SODIUM SERPL-SCNC: 138 MMOL/L (ref 135–145)
TRIGL SERPL-MCNC: 93 MG/DL (ref 0–149)
WBC # BLD AUTO: 5.2 K/UL (ref 4.8–10.8)

## 2021-07-12 PROCEDURE — 80061 LIPID PANEL: CPT

## 2021-07-12 PROCEDURE — 84153 ASSAY OF PSA TOTAL: CPT

## 2021-07-12 PROCEDURE — 36415 COLL VENOUS BLD VENIPUNCTURE: CPT

## 2021-07-12 PROCEDURE — 80053 COMPREHEN METABOLIC PANEL: CPT

## 2021-07-12 PROCEDURE — 85025 COMPLETE CBC W/AUTO DIFF WBC: CPT

## 2021-10-21 ENCOUNTER — HOSPITAL ENCOUNTER (OUTPATIENT)
Dept: LAB | Facility: MEDICAL CENTER | Age: 71
End: 2021-10-21
Attending: FAMILY MEDICINE
Payer: MEDICARE

## 2021-10-21 LAB
ALBUMIN SERPL BCP-MCNC: 4.6 G/DL (ref 3.2–4.9)
ALBUMIN/GLOB SERPL: 1.9 G/DL
ALP SERPL-CCNC: 96 U/L (ref 30–99)
ALT SERPL-CCNC: 14 U/L (ref 2–50)
ANION GAP SERPL CALC-SCNC: 9 MMOL/L (ref 7–16)
AST SERPL-CCNC: 17 U/L (ref 12–45)
BILIRUB SERPL-MCNC: 0.6 MG/DL (ref 0.1–1.5)
BUN SERPL-MCNC: 21 MG/DL (ref 8–22)
CALCIUM SERPL-MCNC: 9.4 MG/DL (ref 8.5–10.5)
CHLORIDE SERPL-SCNC: 103 MMOL/L (ref 96–112)
CHOLEST SERPL-MCNC: 161 MG/DL (ref 100–199)
CO2 SERPL-SCNC: 26 MMOL/L (ref 20–33)
CREAT SERPL-MCNC: 0.93 MG/DL (ref 0.5–1.4)
FASTING STATUS PATIENT QL REPORTED: NORMAL
GLOBULIN SER CALC-MCNC: 2.4 G/DL (ref 1.9–3.5)
GLUCOSE SERPL-MCNC: 94 MG/DL (ref 65–99)
HDLC SERPL-MCNC: 42 MG/DL
LDLC SERPL CALC-MCNC: 101 MG/DL
POTASSIUM SERPL-SCNC: 5 MMOL/L (ref 3.6–5.5)
PROT SERPL-MCNC: 7 G/DL (ref 6–8.2)
SODIUM SERPL-SCNC: 138 MMOL/L (ref 135–145)
TRIGL SERPL-MCNC: 91 MG/DL (ref 0–149)

## 2021-10-21 PROCEDURE — 36415 COLL VENOUS BLD VENIPUNCTURE: CPT

## 2021-10-21 PROCEDURE — 80053 COMPREHEN METABOLIC PANEL: CPT

## 2021-10-21 PROCEDURE — 80061 LIPID PANEL: CPT

## 2022-01-11 ENCOUNTER — PRE-ADMISSION TESTING (OUTPATIENT)
Dept: ADMISSIONS | Facility: MEDICAL CENTER | Age: 72
End: 2022-01-11
Attending: UROLOGY
Payer: MEDICARE

## 2022-01-11 DIAGNOSIS — Z01.810 PRE-OPERATIVE CARDIOVASCULAR EXAMINATION: ICD-10-CM

## 2022-01-11 DIAGNOSIS — Z01.812 PRE-OPERATIVE LABORATORY EXAMINATION: ICD-10-CM

## 2022-01-11 LAB
ANION GAP SERPL CALC-SCNC: 12 MMOL/L (ref 7–16)
APPEARANCE UR: CLEAR
BASOPHILS # BLD AUTO: 0.4 % (ref 0–1.8)
BASOPHILS # BLD: 0.02 K/UL (ref 0–0.12)
BILIRUB UR QL STRIP.AUTO: NEGATIVE
BUN SERPL-MCNC: 23 MG/DL (ref 8–22)
CALCIUM SERPL-MCNC: 9.1 MG/DL (ref 8.5–10.5)
CHLORIDE SERPL-SCNC: 103 MMOL/L (ref 96–112)
CO2 SERPL-SCNC: 24 MMOL/L (ref 20–33)
COLOR UR: YELLOW
CREAT SERPL-MCNC: 0.74 MG/DL (ref 0.5–1.4)
EKG IMPRESSION: NORMAL
EOSINOPHIL # BLD AUTO: 0.04 K/UL (ref 0–0.51)
EOSINOPHIL NFR BLD: 0.7 % (ref 0–6.9)
ERYTHROCYTE [DISTWIDTH] IN BLOOD BY AUTOMATED COUNT: 46.4 FL (ref 35.9–50)
GLUCOSE SERPL-MCNC: 92 MG/DL (ref 65–99)
GLUCOSE UR STRIP.AUTO-MCNC: NEGATIVE MG/DL
HCT VFR BLD AUTO: 43.5 % (ref 42–52)
HGB BLD-MCNC: 14.7 G/DL (ref 14–18)
IMM GRANULOCYTES # BLD AUTO: 0.02 K/UL (ref 0–0.11)
IMM GRANULOCYTES NFR BLD AUTO: 0.4 % (ref 0–0.9)
INR PPP: 0.97 (ref 0.87–1.13)
KETONES UR STRIP.AUTO-MCNC: NEGATIVE MG/DL
LEUKOCYTE ESTERASE UR QL STRIP.AUTO: NEGATIVE
LYMPHOCYTES # BLD AUTO: 1.4 K/UL (ref 1–4.8)
LYMPHOCYTES NFR BLD: 25.8 % (ref 22–41)
MCH RBC QN AUTO: 32.8 PG (ref 27–33)
MCHC RBC AUTO-ENTMCNC: 33.8 G/DL (ref 33.7–35.3)
MCV RBC AUTO: 97.1 FL (ref 81.4–97.8)
MICRO URNS: NORMAL
MONOCYTES # BLD AUTO: 0.44 K/UL (ref 0–0.85)
MONOCYTES NFR BLD AUTO: 8.1 % (ref 0–13.4)
NEUTROPHILS # BLD AUTO: 3.51 K/UL (ref 1.82–7.42)
NEUTROPHILS NFR BLD: 64.6 % (ref 44–72)
NITRITE UR QL STRIP.AUTO: NEGATIVE
NRBC # BLD AUTO: 0 K/UL
NRBC BLD-RTO: 0 /100 WBC
PH UR STRIP.AUTO: 7 [PH] (ref 5–8)
PLATELET # BLD AUTO: 200 K/UL (ref 164–446)
PMV BLD AUTO: 11.7 FL (ref 9–12.9)
POTASSIUM SERPL-SCNC: 4.3 MMOL/L (ref 3.6–5.5)
PROT UR QL STRIP: NEGATIVE MG/DL
PROTHROMBIN TIME: 12.6 SEC (ref 12–14.6)
RBC # BLD AUTO: 4.48 M/UL (ref 4.7–6.1)
RBC UR QL AUTO: NEGATIVE
SODIUM SERPL-SCNC: 139 MMOL/L (ref 135–145)
SP GR UR STRIP.AUTO: 1.02
UROBILINOGEN UR STRIP.AUTO-MCNC: 0.2 MG/DL
WBC # BLD AUTO: 5.4 K/UL (ref 4.8–10.8)

## 2022-01-11 PROCEDURE — 36415 COLL VENOUS BLD VENIPUNCTURE: CPT

## 2022-01-11 PROCEDURE — 85025 COMPLETE CBC W/AUTO DIFF WBC: CPT

## 2022-01-11 PROCEDURE — 85610 PROTHROMBIN TIME: CPT

## 2022-01-11 PROCEDURE — 81003 URINALYSIS AUTO W/O SCOPE: CPT

## 2022-01-11 PROCEDURE — 93005 ELECTROCARDIOGRAM TRACING: CPT

## 2022-01-11 PROCEDURE — 93010 ELECTROCARDIOGRAM REPORT: CPT | Performed by: INTERNAL MEDICINE

## 2022-01-11 PROCEDURE — 87086 URINE CULTURE/COLONY COUNT: CPT

## 2022-01-11 PROCEDURE — 80048 BASIC METABOLIC PNL TOTAL CA: CPT

## 2022-01-11 ASSESSMENT — FIBROSIS 4 INDEX: FIB4 SCORE: 1.75

## 2022-01-13 LAB
BACTERIA UR CULT: NORMAL
SIGNIFICANT IND 70042: NORMAL
SITE SITE: NORMAL
SOURCE SOURCE: NORMAL

## 2022-01-19 ENCOUNTER — HOSPITAL ENCOUNTER (OUTPATIENT)
Facility: MEDICAL CENTER | Age: 72
End: 2022-01-19
Attending: UROLOGY | Admitting: UROLOGY
Payer: MEDICARE

## 2022-01-19 ENCOUNTER — PHARMACY VISIT (OUTPATIENT)
Dept: PHARMACY | Facility: MEDICAL CENTER | Age: 72
End: 2022-01-19
Payer: MEDICARE

## 2022-01-19 ENCOUNTER — ANESTHESIA (OUTPATIENT)
Dept: SURGERY | Facility: MEDICAL CENTER | Age: 72
End: 2022-01-19
Payer: MEDICARE

## 2022-01-19 ENCOUNTER — ANESTHESIA EVENT (OUTPATIENT)
Dept: SURGERY | Facility: MEDICAL CENTER | Age: 72
End: 2022-01-19
Payer: MEDICARE

## 2022-01-19 VITALS
TEMPERATURE: 96.9 F | BODY MASS INDEX: 27.23 KG/M2 | SYSTOLIC BLOOD PRESSURE: 144 MMHG | WEIGHT: 201.06 LBS | HEIGHT: 72 IN | OXYGEN SATURATION: 96 % | RESPIRATION RATE: 18 BRPM | HEART RATE: 66 BPM | DIASTOLIC BLOOD PRESSURE: 77 MMHG

## 2022-01-19 LAB
EXTERNAL QUALITY CONTROL: NORMAL
SARS-COV+SARS-COV-2 AG RESP QL IA.RAPID: NEGATIVE

## 2022-01-19 PROCEDURE — RXMED WILLOW AMBULATORY MEDICATION CHARGE: Performed by: UROLOGY

## 2022-01-19 PROCEDURE — 700105 HCHG RX REV CODE 258: Performed by: UROLOGY

## 2022-01-19 PROCEDURE — 160002 HCHG RECOVERY MINUTES (STAT): Performed by: UROLOGY

## 2022-01-19 PROCEDURE — 160046 HCHG PACU - 1ST 60 MINS PHASE II: Performed by: UROLOGY

## 2022-01-19 PROCEDURE — 700101 HCHG RX REV CODE 250: Performed by: UROLOGY

## 2022-01-19 PROCEDURE — 160025 RECOVERY II MINUTES (STATS): Performed by: UROLOGY

## 2022-01-19 PROCEDURE — 160039 HCHG SURGERY MINUTES - EA ADDL 1 MIN LEVEL 3: Performed by: UROLOGY

## 2022-01-19 PROCEDURE — 88305 TISSUE EXAM BY PATHOLOGIST: CPT

## 2022-01-19 PROCEDURE — 160048 HCHG OR STATISTICAL LEVEL 1-5: Performed by: UROLOGY

## 2022-01-19 PROCEDURE — A4357 BEDSIDE DRAINAGE BAG: HCPCS | Performed by: UROLOGY

## 2022-01-19 PROCEDURE — A9270 NON-COVERED ITEM OR SERVICE: HCPCS | Performed by: ANESTHESIOLOGY

## 2022-01-19 PROCEDURE — 700105 HCHG RX REV CODE 258: Performed by: ANESTHESIOLOGY

## 2022-01-19 PROCEDURE — 87426 SARSCOV CORONAVIRUS AG IA: CPT | Performed by: UROLOGY

## 2022-01-19 PROCEDURE — A9270 NON-COVERED ITEM OR SERVICE: HCPCS | Performed by: UROLOGY

## 2022-01-19 PROCEDURE — 700102 HCHG RX REV CODE 250 W/ 637 OVERRIDE(OP): Performed by: UROLOGY

## 2022-01-19 PROCEDURE — 160009 HCHG ANES TIME/MIN: Performed by: UROLOGY

## 2022-01-19 PROCEDURE — 160035 HCHG PACU - 1ST 60 MINS PHASE I: Performed by: UROLOGY

## 2022-01-19 PROCEDURE — 160047 HCHG PACU  - EA ADDL 30 MINS PHASE II: Performed by: UROLOGY

## 2022-01-19 PROCEDURE — A4338 INDWELLING CATHETER LATEX: HCPCS | Performed by: UROLOGY

## 2022-01-19 PROCEDURE — 700111 HCHG RX REV CODE 636 W/ 250 OVERRIDE (IP): Performed by: ANESTHESIOLOGY

## 2022-01-19 PROCEDURE — 160028 HCHG SURGERY MINUTES - 1ST 30 MINS LEVEL 3: Performed by: UROLOGY

## 2022-01-19 PROCEDURE — 700102 HCHG RX REV CODE 250 W/ 637 OVERRIDE(OP): Performed by: ANESTHESIOLOGY

## 2022-01-19 PROCEDURE — 700101 HCHG RX REV CODE 250: Performed by: ANESTHESIOLOGY

## 2022-01-19 RX ORDER — SODIUM CHLORIDE, SODIUM LACTATE, POTASSIUM CHLORIDE, CALCIUM CHLORIDE 600; 310; 30; 20 MG/100ML; MG/100ML; MG/100ML; MG/100ML
INJECTION, SOLUTION INTRAVENOUS CONTINUOUS
Status: DISCONTINUED | OUTPATIENT
Start: 2022-01-19 | End: 2022-01-19 | Stop reason: HOSPADM

## 2022-01-19 RX ORDER — CELECOXIB 200 MG/1
200 CAPSULE ORAL ONCE
Status: COMPLETED | OUTPATIENT
Start: 2022-01-19 | End: 2022-01-19

## 2022-01-19 RX ORDER — DIPHENHYDRAMINE HYDROCHLORIDE 50 MG/ML
12.5 INJECTION INTRAMUSCULAR; INTRAVENOUS
Status: DISCONTINUED | OUTPATIENT
Start: 2022-01-19 | End: 2022-01-19 | Stop reason: HOSPADM

## 2022-01-19 RX ORDER — LABETALOL HYDROCHLORIDE 5 MG/ML
5 INJECTION, SOLUTION INTRAVENOUS
Status: DISCONTINUED | OUTPATIENT
Start: 2022-01-19 | End: 2022-01-19 | Stop reason: HOSPADM

## 2022-01-19 RX ORDER — ROCURONIUM BROMIDE 10 MG/ML
INJECTION, SOLUTION INTRAVENOUS PRN
Status: DISCONTINUED | OUTPATIENT
Start: 2022-01-19 | End: 2022-01-19 | Stop reason: SURG

## 2022-01-19 RX ORDER — HYDROMORPHONE HYDROCHLORIDE 1 MG/ML
0.4 INJECTION, SOLUTION INTRAMUSCULAR; INTRAVENOUS; SUBCUTANEOUS
Status: DISCONTINUED | OUTPATIENT
Start: 2022-01-19 | End: 2022-01-19 | Stop reason: HOSPADM

## 2022-01-19 RX ORDER — ONDANSETRON 2 MG/ML
INJECTION INTRAMUSCULAR; INTRAVENOUS PRN
Status: DISCONTINUED | OUTPATIENT
Start: 2022-01-19 | End: 2022-01-19 | Stop reason: SURG

## 2022-01-19 RX ORDER — HYDROCODONE BITARTRATE AND ACETAMINOPHEN 5; 325 MG/1; MG/1
TABLET ORAL
Qty: 10 TABLET | Refills: 0 | OUTPATIENT
Start: 2022-01-19

## 2022-01-19 RX ORDER — HYDROMORPHONE HYDROCHLORIDE 1 MG/ML
0.2 INJECTION, SOLUTION INTRAMUSCULAR; INTRAVENOUS; SUBCUTANEOUS
Status: DISCONTINUED | OUTPATIENT
Start: 2022-01-19 | End: 2022-01-19 | Stop reason: HOSPADM

## 2022-01-19 RX ORDER — ATROPA BELLADONNA AND OPIUM 16.2; 3 MG/1; MG/1
SUPPOSITORY RECTAL
Status: DISCONTINUED | OUTPATIENT
Start: 2022-01-19 | End: 2022-01-19 | Stop reason: HOSPADM

## 2022-01-19 RX ORDER — HYDRALAZINE HYDROCHLORIDE 20 MG/ML
5 INJECTION INTRAMUSCULAR; INTRAVENOUS
Status: DISCONTINUED | OUTPATIENT
Start: 2022-01-19 | End: 2022-01-19 | Stop reason: HOSPADM

## 2022-01-19 RX ORDER — ONDANSETRON 2 MG/ML
4 INJECTION INTRAMUSCULAR; INTRAVENOUS
Status: DISCONTINUED | OUTPATIENT
Start: 2022-01-19 | End: 2022-01-19 | Stop reason: HOSPADM

## 2022-01-19 RX ORDER — HALOPERIDOL 5 MG/ML
1 INJECTION INTRAMUSCULAR
Status: DISCONTINUED | OUTPATIENT
Start: 2022-01-19 | End: 2022-01-19 | Stop reason: HOSPADM

## 2022-01-19 RX ORDER — HYDROMORPHONE HYDROCHLORIDE 1 MG/ML
0.1 INJECTION, SOLUTION INTRAMUSCULAR; INTRAVENOUS; SUBCUTANEOUS
Status: DISCONTINUED | OUTPATIENT
Start: 2022-01-19 | End: 2022-01-19 | Stop reason: HOSPADM

## 2022-01-19 RX ORDER — CEFAZOLIN SODIUM 1 G/3ML
INJECTION, POWDER, FOR SOLUTION INTRAMUSCULAR; INTRAVENOUS PRN
Status: DISCONTINUED | OUTPATIENT
Start: 2022-01-19 | End: 2022-01-19 | Stop reason: SURG

## 2022-01-19 RX ORDER — OXYCODONE HCL 5 MG/5 ML
5 SOLUTION, ORAL ORAL
Status: DISCONTINUED | OUTPATIENT
Start: 2022-01-19 | End: 2022-01-19 | Stop reason: HOSPADM

## 2022-01-19 RX ORDER — METOPROLOL TARTRATE 1 MG/ML
1 INJECTION, SOLUTION INTRAVENOUS
Status: DISCONTINUED | OUTPATIENT
Start: 2022-01-19 | End: 2022-01-19 | Stop reason: HOSPADM

## 2022-01-19 RX ORDER — OXYCODONE HCL 5 MG/5 ML
10 SOLUTION, ORAL ORAL
Status: DISCONTINUED | OUTPATIENT
Start: 2022-01-19 | End: 2022-01-19 | Stop reason: HOSPADM

## 2022-01-19 RX ORDER — ACETAMINOPHEN 500 MG
1000 TABLET ORAL ONCE
Status: COMPLETED | OUTPATIENT
Start: 2022-01-19 | End: 2022-01-19

## 2022-01-19 RX ORDER — LIDOCAINE HYDROCHLORIDE 20 MG/ML
INJECTION, SOLUTION EPIDURAL; INFILTRATION; INTRACAUDAL; PERINEURAL PRN
Status: DISCONTINUED | OUTPATIENT
Start: 2022-01-19 | End: 2022-01-19 | Stop reason: SURG

## 2022-01-19 RX ORDER — SODIUM CHLORIDE, SODIUM LACTATE, POTASSIUM CHLORIDE, CALCIUM CHLORIDE 600; 310; 30; 20 MG/100ML; MG/100ML; MG/100ML; MG/100ML
INJECTION, SOLUTION INTRAVENOUS CONTINUOUS
Status: ACTIVE | OUTPATIENT
Start: 2022-01-19 | End: 2022-01-19

## 2022-01-19 RX ADMIN — CELECOXIB 200 MG: 200 CAPSULE ORAL at 13:33

## 2022-01-19 RX ADMIN — SODIUM CHLORIDE, POTASSIUM CHLORIDE, SODIUM LACTATE AND CALCIUM CHLORIDE: 600; 310; 30; 20 INJECTION, SOLUTION INTRAVENOUS at 13:18

## 2022-01-19 RX ADMIN — SODIUM CHLORIDE, POTASSIUM CHLORIDE, SODIUM LACTATE AND CALCIUM CHLORIDE 1000 ML: 600; 310; 30; 20 INJECTION, SOLUTION INTRAVENOUS at 17:21

## 2022-01-19 RX ADMIN — LIDOCAINE HYDROCHLORIDE 0.5 ML: 10 INJECTION, SOLUTION EPIDURAL; INFILTRATION; INTRACAUDAL; PERINEURAL at 13:19

## 2022-01-19 RX ADMIN — PROPOFOL 120 MG: 10 INJECTION, EMULSION INTRAVENOUS at 16:18

## 2022-01-19 RX ADMIN — FENTANYL CITRATE 50 MCG: 50 INJECTION, SOLUTION INTRAMUSCULAR; INTRAVENOUS at 16:52

## 2022-01-19 RX ADMIN — FENTANYL CITRATE 50 MCG: 50 INJECTION, SOLUTION INTRAMUSCULAR; INTRAVENOUS at 16:31

## 2022-01-19 RX ADMIN — ONDANSETRON 4 MG: 2 INJECTION INTRAMUSCULAR; INTRAVENOUS at 17:00

## 2022-01-19 RX ADMIN — CEFAZOLIN 2 G: 330 INJECTION, POWDER, FOR SOLUTION INTRAMUSCULAR; INTRAVENOUS at 16:18

## 2022-01-19 RX ADMIN — ROCURONIUM BROMIDE 50 MG: 10 INJECTION, SOLUTION INTRAVENOUS at 16:18

## 2022-01-19 RX ADMIN — SUGAMMADEX 200 MG: 100 INJECTION, SOLUTION INTRAVENOUS at 17:00

## 2022-01-19 RX ADMIN — LIDOCAINE HYDROCHLORIDE 100 MG: 20 INJECTION, SOLUTION EPIDURAL; INFILTRATION; INTRACAUDAL at 16:18

## 2022-01-19 RX ADMIN — ACETAMINOPHEN 1000 MG: 500 TABLET ORAL at 13:33

## 2022-01-19 RX ADMIN — FENTANYL CITRATE 50 MCG: 50 INJECTION, SOLUTION INTRAMUSCULAR; INTRAVENOUS at 16:18

## 2022-01-19 ASSESSMENT — PAIN DESCRIPTION - PAIN TYPE
TYPE: SURGICAL PAIN

## 2022-01-19 ASSESSMENT — FIBROSIS 4 INDEX: FIB4 SCORE: 1.66

## 2022-01-19 ASSESSMENT — PAIN SCALES - GENERAL: PAIN_LEVEL: 1

## 2022-01-19 NOTE — ANESTHESIA PREPROCEDURE EVALUATION
Case: [id number] Date/Time: 01/19/22 1345    Procedure: TURP, USING BUTTON ELECTRODE    Pre-op diagnosis: LOWER URINARY TRACT SYMPTOMS DUE TO BENIGN PROSTATIC HYPERTROPHY    Location: [facility] OR 18 / SURGERY [facility]    Surgeons: Flash Vasquez M.D.          Relevant Problems   NEURO   (positive) History of skin cancer      CARDIAC   (positive) Erectile dysfunction due to arterial insufficiency       Physical Exam    Airway   Mallampati: II  TM distance: >3 FB  Neck ROM: full       Cardiovascular - normal exam  Rhythm: regular  Rate: normal  (-) murmur     Dental - normal exam           Pulmonary - normal exam  Breath sounds clear to auscultation     Abdominal    Neurological - normal exam                 Anesthesia Plan    ASA 2       Plan - general       Airway plan will be ETT          Induction: intravenous    Postoperative Plan: Postoperative administration of opioids is intended.    Pertinent diagnostic labs and testing reviewed    Informed Consent:    Anesthetic plan and risks discussed with patient.    Use of blood products discussed with: patient whom consented to blood products.

## 2022-01-20 LAB — PATHOLOGY CONSULT NOTE: NORMAL

## 2022-01-20 NOTE — OR NURSING
Pt's VSS; denies N/V; states pain is at tolerable level. D/c orders received. IV dc'd. Pt changed into clothing with assistance. Discharge instructions given as well as pain management handout; pt and family verbalized understanding and questions answered. Patient states ready to d/c home. No prescriptions given. Pt dc'd in w/c with wife in stable condition. Webster care and instructions given to wife and pt, no leg bag was given due to pt preference.

## 2022-01-20 NOTE — OP REPORT
DATE OF SERVICE:  01/19/2022     UROLOGIC OPERATIVE REPORT     PREOPERATIVE DIAGNOSES:  1.  Adenocarcinoma of the prostate.  2.  Benign prostatic hypertrophy with trilobar obstruction and severe lower   urinary tract symptoms.     OPERATIONS AND PROCEDURES PERFORMED:  1.  Rigid cystourethroscopy.  2.  Bipolar transurethral resection of the prostate.     SURGEON:  Flash Vasquez MD     ANESTHESIA:  General laryngeal mask.     ANESTHESIOLOGIST:  Murali Meza MD     POSTOPERATIVE DIAGNOSES:  1.  Adenocarcinoma of the prostate.  2.  Benign prostatic hypertrophy with trilobar obstruction and severe lower   urinary tract symptoms.     COMPLICATIONS:  None.     DRAINS:  A 22-Maori 2-way Webster to gravity.     SPECIMENS:  Prostatic chips to pathology for permanent section.     INDICATIONS:  The patient is a pleasant gentleman with a history of elevated   PSA, underwent a biopsy and was found to have adenocarcinoma of the prostate.    He has been seen in consultation by Dr. Wells of [facility] and the   plan is for radiation treatment for the prostate, but he has severe lower   urinary tract symptoms, so we discussed the option of preradiation and   transurethral resection of the prostate.  Given the severity of the symptoms   and his need for additional care, I have recommended bipolar transurethral   resection as he has a large median lobe.  Prior to surgery, I discussed the   risk of the procedure including but not limited to risk of perioperative   urinary tract infection, urosepsis, risk of urethral strictures, and delayed   complication of instrumentation.  He is aware that 75% of men undergoing a   transurethral resection of the prostate will have permanent retrograde   ejaculation and explained in layman's terms what this is, and he accepts it.    I also discussed the risk of complete incontinence in 1% of cases as well as   failure to improve his symptoms in 5-7% of cases.  He is also aware if he has    a significantly obstructed bladder for many years, he may have urgency,   frequency postop which may seem worse initially and then improved.  I have   also explained the potential need for adjuvant therapy with medical management   if this is severe as well as the perioperative risk of deep vein thrombosis,   pulmonary embolism, aspiration pneumonia, heart attack, stroke and death.    Informed consent was given to me by the patient to proceed.     DESCRIPTION OF PROCEDURE:  After informed consent was obtained, the patient   was brought to the operating room and placed supine.  Bilateral sequential   compression device in place and operational.  General laryngeal mask   anesthetic was administered in a balanced fashion.  The patient received   weight-based Ancef.  He was positioned in modified lithotomy where the   operative area was Betadine prepped and draped in the usual sterile fashion.    A surgical timeout was called and all members of the operative team agree as   to the patient's name and procedure to be performed without objections,   attention was directed to the procedure.     I calibrated the anterior urethra with Abhinav sounds from 22 to 26-Sinhala.    I then passed with the visualizing obturator and a 30-degree lens, the   26-Sinhala resectoscope.  The anterior urethra was normal.  The prostatic fossa   measured 5 cm in length.  He has a large median lobe.  Left and right   ureteral orifices were identified, partly obscured due to the median lobe.    The bladder showed 2+ trabeculation, but no stones, tumors, or diverticula.    At this point in time with bipolar current and a loop, I marked the level of   the verumontanum on the lateral lobes and anterior commissure.  I resected the   median lobe with the loop initially, cauterizing the bladder neck at 5 and 7   o'clock where there was bleeding.  I then resected the anterior commissure and   resected to the surgical capsule from 12 o'clock to 5  o'clock sparing the   apical tissue.  I then resected from 12 o'clock to 7 o'clock sparing the   apical tissue and the floor.  At this point in time, I resected the floor,   sparing the verumontanum.  I then resected the lateral tissue.  I used an   Ellik to remove some of the chips and then I switched to the button electrode   and with bipolar button and coagulation current, I was able to cauterize   bleeding points and I then used a cutting current for treatment at the apical   tissue.  Care was taken to avoid any treatment beyond the verumontanum.  At   the completion of the resection, the prostatic fossa was widely patent.  The   bladder neck was open.  There was no significant bleeding.  The Ellik   evacuator was used to remove the remaining chips.  Direct visualization of the   bladder showed no remaining chips.  Both ureteral orifices were easily   identified and uninvolved in the resection and with the bladder full, I   withdrew the scope.  He had a strong stream of clear urine.  I subsequently   easily passed a 22-Vietnamese Webster, inflated the balloon with 25 mL sterile water   and left to gravity drainage.  At the end of the case, I placed 30 mg   belladonna and opium suppository for bladder spasm control.  He tolerated the   procedure well with an estimated blood loss of 50 mL, was awakened in the   operating room and transferred to recovery room where he arrived in stable   condition.        ______________________________  MD LLOYD Carpio/REGINO    DD:  01/20/2022 06:57  DT:  01/20/2022 07:12    Job#:  [id number]

## 2022-01-20 NOTE — OR NURSING
1710:: received to PACU via gurney with oral airway. Respirations spontaneous and unlabored. Oral airway dc'd upon arrival to PACU without problem or difficulty. Webster to down drain with light pink colored urine output. No blood clot noted.    1730: c/o penile tip discomfort. Scale 1/0.  Declines pain medication. Taking fluids well. Denies nausea.    1800: report called to Wendie SUN.    1806: transported via gurney to PACU 2. Stable. Patient wearing face mask.

## 2022-01-20 NOTE — OR SURGEON
Immediate Post OP Note    PreOp Diagnosis: Prostate Cancer                               BPH with Severe LUTS      PostOp Diagnosis: As above      Procedure(s):  RIGID CYSTOSCOPY  TURP USING BIPOLAR ELECTRODE - Wound Class: Clean Contaminated    Surgeon(s):  Flash Vasquez M.D.    Anesthesiologist/Type of Anesthesia:  Anesthesiologist: Murali Meza M.D./General LMA    Surgical Staff:  Circulator: Juan Carlos Piedra R.N.  Scrub Person: Sherry Dejesus    Specimens removed if any:  ID Type Source Tests Collected by Time Destination   A :  Tissue Prostate PATHOLOGY SPECIMEN Flash Vasquez M.D. 1/19/2022  4:43 PM        Estimated Blood Loss: 50ml    Findings: Trilobar hypertrophy of the prostate    Complications: As above        1/19/2022 5:18 PM Flash Vasquez M.D.

## 2022-01-20 NOTE — ANESTHESIA PROCEDURE NOTES
Airway    Date/Time: 1/19/2022 4:19 PM  Performed by: Murali Meza M.D.  Authorized by: Murali Meza M.D.     Location:  OR  Urgency:  Elective  Indications for Airway Management:  Anesthesia      Spontaneous Ventilation: absent    Sedation Level:  Deep  Preoxygenated: Yes    Patient Position:  Sniffing  Mask Difficulty Assessment:  1 - vent by mask  Final Airway Type:  Endotracheal airway  Final Endotracheal Airway:  ETT  Cuffed: Yes    Technique Used for Successful ETT Placement:  Direct laryngoscopy    Insertion Site:  Oral  Blade Type:  Ayse  Laryngoscope Blade/Videolaryngoscope Blade Size:  3  ETT Size (mm):  7.5  Measured from:  Teeth  ETT to Teeth (cm):  24  Placement Verified by: auscultation and capnometry    Cormack-Lehane Classification:  Grade IIa - partial view of glottis  Number of Attempts at Approach:  1

## 2022-01-20 NOTE — ANESTHESIA TIME REPORT
Anesthesia Start and Stop Event Times     Date Time Event    1/19/2022 1550 Ready for Procedure     1611 Anesthesia Start     1711 Anesthesia Stop        Responsible Staff  01/19/22    Name Role Begin End    Murali Meza M.D. Anesth 1611 1711        Preop Diagnosis (Free Text):  Pre-op Diagnosis     LOWER URINARY TRACT SYMPTOMS DUE TO PROSTATIC HYPERTROPHYand prostate cancer        Preop Diagnosis (Codes):    Premium Reason  A. 3PM - 7AM    Comments:

## 2022-01-20 NOTE — DISCHARGE INSTRUCTIONS
ACTIVITY: Rest and take it easy for the first 24 hours.  A responsible adult is recommended to remain with you during that time.  It is normal to feel sleepy.  We encourage you to not do anything that requires balance, judgment or coordination.    MILD FLU-LIKE SYMPTOMS ARE NORMAL. YOU MAY EXPERIENCE GENERALIZED MUSCLE ACHES, THROAT IRRITATION, HEADACHE AND/OR SOME NAUSEA.    FOR 24 HOURS DO NOT:  Drive, operate machinery or run household appliances.  Drink beer or alcoholic beverages.   Make important decisions or sign legal documents.    SPECIAL INSTRUCTIONS:     Advance Diet As Tolerated to regular.    Activity:Teach stuart care and leg bag use.    Follow-up at [facility] on  for trial of voiding.    Pain medication sent to pharmacy with [employer] [facility].        Drink plenty of fluids, your urine may be pink/red tinged      Indwelling Urinary Catheter Care, Adult  An indwelling urinary catheter is a thin tube that is put into your bladder. The tube helps to drain pee (urine) out of your body. The tube goes in through your urethra. Your urethra is where pee comes out of your body. Your pee will come out through the catheter, then it will go into a bag (drainage bag).  Take good care of your catheter so it will work well.  How to wear your catheter and bag  Supplies needed  · Sticky tape (adhesive tape) or a leg strap.  · Alcohol wipe or soap and water (if you use tape).  · A clean towel (if you use tape).  · Large overnight bag.  · Smaller bag (leg bag).  Wearing your catheter  Attach your catheter to your leg with tape or a leg strap.  · Make sure the catheter is not pulled tight.  · If a leg strap gets wet, take it off and put on a dry strap.  · If you use tape to hold the bag on your le. Use an alcohol wipe or soap and water to wash your skin where the tape made it sticky before.  2. Use a clean towel to pat-dry that skin.  3. Use new tape to make the bag stay on your leg.  Wearing your bags  You  should have been given a large overnight bag.  · You may wear the overnight bag in the day or night.  · Always have the overnight bag lower than your bladder.  Do not let the bag touch the floor.  · Before you go to sleep, put a clean plastic bag in a wastebasket. Then hang the overnight bag inside the wastebasket.  You should also have a smaller leg bag that fits under your clothes.  · Always wear the leg bag below your knee.  · Do not wear your leg bag at night.  How to care for your skin and catheter  Supplies needed  · A clean washcloth.  · Water and mild soap.  · A clean towel.  Caring for your skin and catheter  · Clean the skin around your catheter every day:  ? Wash your hands with soap and water.  ? Wet a clean washcloth in warm water and mild soap.  ? Clean the skin around your urethra.  ? If you are male:  ? Pull back any skin that covers the end of your penis (foreskin).  ? With the washcloth in your other hand, wipe your penis in small circles. Start wiping at the tip of your penis, then move away from the catheter.  ? Move the foreskin back in place, if needed.  ? With your free hand, hold the catheter close to where it goes into your body.  ? Keep holding the catheter during cleaning so it does not get pulled out.  ? With the washcloth in your other hand, clean the catheter.  ? Only wipe downward on the catheter.  ? Do not wipe upward toward your body. Doing this may push germs into your urethra and cause infection.  ? Use a clean towel to pat-dry the catheter and the skin around it. Make sure to wipe off all soap.  ? Wash your hands with soap and water.  · Shower every day. Do not take baths.  · Do not use cream, ointment, or lotion on the area where the catheter goes into your body, unless your doctor tells you to.  · Do not use powders, sprays, or lotions on your genital area.  · Check your skin around the catheter every day for signs of infection. Check for:  ? Redness, swelling, or pain.  ? Fluid  or blood.  ? Warmth.  ? Pus or a bad smell.  How to empty the bag  Supplies needed  · Rubbing alcohol.  · Gauze pad or cotton ball.  · Tape or a leg strap.  Emptying the bag  Pour the pee out of your bag when it is ?-½ full, or at least 2-3 times a day. Do this for your overnight bag and your leg bag.  1. Wash your hands with soap and water.  2. Separate (detach) the bag from your leg.  3. Hold the bag over the toilet or a clean pail. Keep the bag lower than your hips and bladder. This is so the pee (urine) does not go back into the tube.  4. Open the pour spout. It is at the bottom of the bag.  5. Empty the pee into the toilet or pail. Do not let the pour spout touch any surface.  6. Put rubbing alcohol on a gauze pad or cotton ball.  7. Use the gauze pad or cotton ball to clean the pour spout.  8. Close the pour spout.  9. Attach the bag to your leg with tape or a leg strap.  10. Wash your hands with soap and water.  Follow instructions for cleaning the drainage bag:  · From the product maker.  · As told by your doctor.  How to change the bag  Supplies needed  · Alcohol wipes.  · A clean bag.  · Tape or a leg strap.  Changing the bag  Replace your bag when it starts to leak, smell bad, or look dirty.  1. Wash your hands with soap and water.  2. Separate the dirty bag from your leg.  3. Pinch the catheter with your fingers so that pee does not spill out.  4. Separate the catheter tube from the bag tube where these tubes connect (at the connection valve). Do not let the tubes touch any surface.  5. Clean the end of the catheter tube with an alcohol wipe. Use a different alcohol wipe to clean the end of the bag tube.  6. Connect the catheter tube to the tube of the clean bag.  7. Attach the clean bag to your leg with tape or a leg strap. Do not make the bag tight on your leg.  8. Wash your hands with soap and water.  General rules    · Never pull on your catheter. Never try to take it out. Doing that can hurt  you.  · Always wash your hands before and after you touch your catheter or bag. Use a mild, fragrance-free soap. If you do not have soap and water, use hand .  · Always make sure there are no twists or bends (kinks) in the catheter tube.  · Always make sure there are no leaks in the catheter or bag.  · Drink enough fluid to keep your pee pale yellow.  · Do not take baths, swim, or use a hot tub.  · If you are female, wipe from front to back after you poop (have a bowel movement).  Contact a doctor if:  · Your pee is cloudy.  · Your pee smells worse than usual.  · Your catheter gets clogged.  · Your catheter leaks.  · Your bladder feels full.  Get help right away if:  · You have redness, swelling, or pain where the catheter goes into your body.  · You have fluid, blood, pus, or a bad smell coming from the area where the catheter goes into your body.  · Your skin feels warm where the catheter goes into your body.  · You have a fever.  · You have pain in your:  ? Belly (abdomen).  ? Legs.  ? Lower back.  ? Bladder.  · You see blood in the catheter.  · Your pee is pink or red.  · You feel sick to your stomach (nauseous).  · You throw up (vomit).  · You have chills.  · Your pee is not draining into the bag.  · Your catheter gets pulled out.  Summary  · An indwelling urinary catheter is a thin tube that is placed into the bladder to help drain pee (urine) out of the body.  · The catheter is placed into the part of the body that drains pee from the bladder (urethra).  · Taking good care of your catheter will keep it working properly and help prevent problems.  · Always wash your hands before and after touching your catheter or bag.  · Never pull on your catheter or try to take it out.  This information is not intended to replace advice given to you by your health care provider. Make sure you discuss any questions you have with your health care provider.    DIET: To avoid nausea, slowly advance diet as tolerated,  avoiding spicy or greasy foods for the first day.  Add more substantial food to your diet according to your physician's instructions. INCREASE FLUIDS AND FIBER TO AVOID CONSTIPATION.    SURGICAL DRESSING/BATHING: You may shower, do not soak in bath or hot tubs or swimming pools while stuart in place    FOLLOW-UP APPOINTMENT:  A follow-up appointment should be arranged with your doctor -follow up January 21 for Voiding Trial; call to schedule.    You should CALL YOUR PHYSICIAN if you develop:  Fever greater than 101 degrees F.  Pain not relieved by medication, or persistent nausea or vomiting.  Excessive bleeding (blood soaking through dressing) or unexpected drainage from the wound.  Extreme redness or swelling around the incision site, drainage of pus or foul smelling drainage.  Inability to urinate or empty your bladder within 8 hours.  Problems with breathing or chest pain.    You should call 911 if you develop problems with breathing or chest pain.  If you are unable to contact your doctor or surgical center, you should go to the nearest emergency room or urgent care center.    Physician's telephone #: [phone] Dr Vasquez    If any questions arise, call your doctor.  If your doctor is not available, please feel free to call the Surgical Center at [phone].     A registered nurse may call you a few days after your surgery to see how you are doing after your procedure.    MEDICATIONS: Resume taking daily medication.  Take prescribed pain medication with food.  If no medication is prescribed, you may take non-aspirin pain medication if needed.  PAIN MEDICATION CAN BE VERY CONSTIPATING.  Take a stool softener or laxative such as senokot, pericolace, or milk of magnesia if needed.    Prescription given for electronically sent to your Pharmacy by Dr Vasquez.  Last pain medication given at none.    If your physician has prescribed pain medication that includes Acetaminophen (Tylenol), do not take additional  Acetaminophen (Tylenol) while taking the prescribed medication.    Depression / Suicide Risk    As you are discharged from this [facility] facility, it is important to learn how to keep safe from harming yourself.    Recognize the warning signs:  · Abrupt changes in personality, positive or negative- including increase in energy   · Giving away possessions  · Change in eating patterns- significant weight changes-  positive or negative  · Change in sleeping patterns- unable to sleep or sleeping all the time   · Unwillingness or inability to communicate  · Depression  · Unusual sadness, discouragement and loneliness  · Talk of wanting to die  · Neglect of personal appearance   · Rebelliousness- reckless behavior  · Withdrawal from people/activities they love  · Confusion- inability to concentrate     If you or a loved one observes any of these behaviors or has concerns about self-harm, here's what you can do:  · Talk about it- your feelings and reasons for harming yourself  · Remove any means that you might use to hurt yourself (examples: pills, rope, extension cords, firearm)  · Get professional help from the community (Mental Health, Substance Abuse, psychological counseling)  · Do not be alone:Call your Safe Contact- someone whom you trust who will be there for you.  · Call your local CRISIS HOTLINE 944-3381 or 931-145-1391  · Call your local Children's Mobile Crisis Response Team [place] (267) 144-7908 or www.[employer]  · Call the toll free National Suicide Prevention Hotlines   · National Suicide Prevention Lifeline 736-853-PVSS (3203)  · National Hope Line Network 800-SUICIDE (135-0698)

## 2022-01-20 NOTE — ANESTHESIA POSTPROCEDURE EVALUATION
Patient: Tato Benjamin    Procedure Summary     Date: 01/19/22 Room / Location: [place] [address] / SURGERY [facility]    Anesthesia Start: 1611 Anesthesia Stop: 1711    Procedure: TURP, USING BUTTON ELECTRODE (N/A Bladder) Diagnosis: (LOWER URINARY TRACT SYMPTOMS DUE TO PROSTATIC HYPERTROPHY and prostate cancer)    Surgeons: Flash Vasquez M.D. Responsible Provider: Murali Meza M.D.    Anesthesia Type: general ASA Status: 2          Final Anesthesia Type: general  Last vitals  BP   Blood Pressure : 156/71    Temp   37.4 °C (99.3 °F)    Pulse   68   Resp   (!) 8    SpO2   97 %      Anesthesia Post Evaluation    Patient location during evaluation: PACU  Patient participation: complete - patient participated  Level of consciousness: awake and alert  Pain score: 1    Airway patency: patent  Anesthetic complications: no  Cardiovascular status: hemodynamically stable  Respiratory status: acceptable  Hydration status: euvolemic    PONV: none          No complications documented.     Nurse Pain Score: 1 (NPRS)

## 2022-01-24 ENCOUNTER — HOSPITAL ENCOUNTER (OUTPATIENT)
Dept: LAB | Facility: MEDICAL CENTER | Age: 72
End: 2022-01-24
Attending: FAMILY MEDICINE
Payer: MEDICARE

## 2022-01-24 LAB
ALBUMIN SERPL BCP-MCNC: 4.6 G/DL (ref 3.2–4.9)
ALBUMIN/GLOB SERPL: 1.6 G/DL
ALP SERPL-CCNC: 109 U/L (ref 30–99)
ALT SERPL-CCNC: 20 U/L (ref 2–50)
ANION GAP SERPL CALC-SCNC: 12 MMOL/L (ref 7–16)
AST SERPL-CCNC: 25 U/L (ref 12–45)
BASOPHILS # BLD AUTO: 0.8 % (ref 0–1.8)
BASOPHILS # BLD: 0.05 K/UL (ref 0–0.12)
BILIRUB SERPL-MCNC: 0.8 MG/DL (ref 0.1–1.5)
BUN SERPL-MCNC: 21 MG/DL (ref 8–22)
CALCIUM SERPL-MCNC: 9.5 MG/DL (ref 8.5–10.5)
CHLORIDE SERPL-SCNC: 101 MMOL/L (ref 96–112)
CHOLEST SERPL-MCNC: 194 MG/DL (ref 100–199)
CO2 SERPL-SCNC: 24 MMOL/L (ref 20–33)
CREAT SERPL-MCNC: 0.89 MG/DL (ref 0.5–1.4)
EOSINOPHIL # BLD AUTO: 0.2 K/UL (ref 0–0.51)
EOSINOPHIL NFR BLD: 3.2 % (ref 0–6.9)
ERYTHROCYTE [DISTWIDTH] IN BLOOD BY AUTOMATED COUNT: 46.8 FL (ref 35.9–50)
FASTING STATUS PATIENT QL REPORTED: NORMAL
GLOBULIN SER CALC-MCNC: 2.9 G/DL (ref 1.9–3.5)
GLUCOSE SERPL-MCNC: 91 MG/DL (ref 65–99)
HCT VFR BLD AUTO: 45.4 % (ref 42–52)
HDLC SERPL-MCNC: 48 MG/DL
HGB BLD-MCNC: 15.3 G/DL (ref 14–18)
IMM GRANULOCYTES # BLD AUTO: 0.03 K/UL (ref 0–0.11)
IMM GRANULOCYTES NFR BLD AUTO: 0.5 % (ref 0–0.9)
LDLC SERPL CALC-MCNC: 131 MG/DL
LYMPHOCYTES # BLD AUTO: 1.4 K/UL (ref 1–4.8)
LYMPHOCYTES NFR BLD: 22.6 % (ref 22–41)
MCH RBC QN AUTO: 32.4 PG (ref 27–33)
MCHC RBC AUTO-ENTMCNC: 33.7 G/DL (ref 33.7–35.3)
MCV RBC AUTO: 96.2 FL (ref 81.4–97.8)
MONOCYTES # BLD AUTO: 0.54 K/UL (ref 0–0.85)
MONOCYTES NFR BLD AUTO: 8.7 % (ref 0–13.4)
NEUTROPHILS # BLD AUTO: 3.97 K/UL (ref 1.82–7.42)
NEUTROPHILS NFR BLD: 64.2 % (ref 44–72)
NRBC # BLD AUTO: 0 K/UL
NRBC BLD-RTO: 0 /100 WBC
PLATELET # BLD AUTO: 227 K/UL (ref 164–446)
PMV BLD AUTO: 12 FL (ref 9–12.9)
POTASSIUM SERPL-SCNC: 5 MMOL/L (ref 3.6–5.5)
PROT SERPL-MCNC: 7.5 G/DL (ref 6–8.2)
PSA SERPL-MCNC: 19.7 NG/ML (ref 0–4)
RBC # BLD AUTO: 4.72 M/UL (ref 4.7–6.1)
SODIUM SERPL-SCNC: 137 MMOL/L (ref 135–145)
TRIGL SERPL-MCNC: 73 MG/DL (ref 0–149)
WBC # BLD AUTO: 6.2 K/UL (ref 4.8–10.8)

## 2022-01-24 PROCEDURE — 80053 COMPREHEN METABOLIC PANEL: CPT

## 2022-01-24 PROCEDURE — 36415 COLL VENOUS BLD VENIPUNCTURE: CPT

## 2022-01-24 PROCEDURE — 85025 COMPLETE CBC W/AUTO DIFF WBC: CPT

## 2022-01-24 PROCEDURE — 84153 ASSAY OF PSA TOTAL: CPT

## 2022-01-24 PROCEDURE — 80061 LIPID PANEL: CPT

## 2022-03-25 ENCOUNTER — HOSPITAL ENCOUNTER (OUTPATIENT)
Dept: LAB | Facility: MEDICAL CENTER | Age: 72
End: 2022-03-25
Attending: FAMILY MEDICINE
Payer: MEDICARE

## 2022-03-25 LAB
ALBUMIN SERPL BCP-MCNC: 4.6 G/DL (ref 3.2–4.9)
ALBUMIN/GLOB SERPL: 2.3 G/DL
ALP SERPL-CCNC: 112 U/L (ref 30–99)
ALT SERPL-CCNC: 17 U/L (ref 2–50)
ANION GAP SERPL CALC-SCNC: 10 MMOL/L (ref 7–16)
AST SERPL-CCNC: 21 U/L (ref 12–45)
BASOPHILS # BLD AUTO: 0.7 % (ref 0–1.8)
BASOPHILS # BLD: 0.04 K/UL (ref 0–0.12)
BILIRUB SERPL-MCNC: 1 MG/DL (ref 0.1–1.5)
BUN SERPL-MCNC: 20 MG/DL (ref 8–22)
CALCIUM SERPL-MCNC: 9.4 MG/DL (ref 8.5–10.5)
CHLORIDE SERPL-SCNC: 102 MMOL/L (ref 96–112)
CHOLEST SERPL-MCNC: 184 MG/DL (ref 100–199)
CO2 SERPL-SCNC: 25 MMOL/L (ref 20–33)
CREAT SERPL-MCNC: 0.83 MG/DL (ref 0.5–1.4)
EOSINOPHIL # BLD AUTO: 0.1 K/UL (ref 0–0.51)
EOSINOPHIL NFR BLD: 1.7 % (ref 0–6.9)
ERYTHROCYTE [DISTWIDTH] IN BLOOD BY AUTOMATED COUNT: 45.6 FL (ref 35.9–50)
EST. AVERAGE GLUCOSE BLD GHB EST-MCNC: 111 MG/DL
FASTING STATUS PATIENT QL REPORTED: NORMAL
GFR SERPLBLD CREATININE-BSD FMLA CKD-EPI: 92 ML/MIN/1.73 M 2
GLOBULIN SER CALC-MCNC: 2 G/DL (ref 1.9–3.5)
GLUCOSE SERPL-MCNC: 92 MG/DL (ref 65–99)
HBA1C MFR BLD: 5.5 % (ref 4–5.6)
HCT VFR BLD AUTO: 44.4 % (ref 42–52)
HDLC SERPL-MCNC: 44 MG/DL
HGB BLD-MCNC: 15 G/DL (ref 14–18)
IMM GRANULOCYTES # BLD AUTO: 0.01 K/UL (ref 0–0.11)
IMM GRANULOCYTES NFR BLD AUTO: 0.2 % (ref 0–0.9)
LDLC SERPL CALC-MCNC: 118 MG/DL
LYMPHOCYTES # BLD AUTO: 1.41 K/UL (ref 1–4.8)
LYMPHOCYTES NFR BLD: 23.8 % (ref 22–41)
MCH RBC QN AUTO: 31.9 PG (ref 27–33)
MCHC RBC AUTO-ENTMCNC: 33.8 G/DL (ref 33.7–35.3)
MCV RBC AUTO: 94.5 FL (ref 81.4–97.8)
MONOCYTES # BLD AUTO: 0.53 K/UL (ref 0–0.85)
MONOCYTES NFR BLD AUTO: 9 % (ref 0–13.4)
NEUTROPHILS # BLD AUTO: 3.83 K/UL (ref 1.82–7.42)
NEUTROPHILS NFR BLD: 64.6 % (ref 44–72)
NRBC # BLD AUTO: 0 K/UL
NRBC BLD-RTO: 0 /100 WBC
PLATELET # BLD AUTO: 218 K/UL (ref 164–446)
PMV BLD AUTO: 11.4 FL (ref 9–12.9)
POTASSIUM SERPL-SCNC: 5.6 MMOL/L (ref 3.6–5.5)
PROT SERPL-MCNC: 6.6 G/DL (ref 6–8.2)
PSA SERPL-MCNC: 7.59 NG/ML (ref 0–4)
RBC # BLD AUTO: 4.7 M/UL (ref 4.7–6.1)
SODIUM SERPL-SCNC: 137 MMOL/L (ref 135–145)
TRIGL SERPL-MCNC: 112 MG/DL (ref 0–149)
WBC # BLD AUTO: 5.9 K/UL (ref 4.8–10.8)

## 2022-03-25 PROCEDURE — 80061 LIPID PANEL: CPT

## 2022-03-25 PROCEDURE — 84153 ASSAY OF PSA TOTAL: CPT

## 2022-03-25 PROCEDURE — 83036 HEMOGLOBIN GLYCOSYLATED A1C: CPT | Mod: GA

## 2022-03-25 PROCEDURE — 36415 COLL VENOUS BLD VENIPUNCTURE: CPT

## 2022-03-25 PROCEDURE — 80053 COMPREHEN METABOLIC PANEL: CPT

## 2022-03-25 PROCEDURE — 85025 COMPLETE CBC W/AUTO DIFF WBC: CPT

## 2022-07-23 ENCOUNTER — HOSPITAL ENCOUNTER (OUTPATIENT)
Dept: LAB | Facility: MEDICAL CENTER | Age: 72
End: 2022-07-23
Attending: FAMILY MEDICINE
Payer: MEDICARE

## 2022-07-23 LAB
ALBUMIN SERPL BCP-MCNC: 4.6 G/DL (ref 3.2–4.9)
ALBUMIN/GLOB SERPL: 1.8 G/DL
ALP SERPL-CCNC: 107 U/L (ref 30–99)
ALT SERPL-CCNC: 21 U/L (ref 2–50)
ANION GAP SERPL CALC-SCNC: 11 MMOL/L (ref 7–16)
AST SERPL-CCNC: 21 U/L (ref 12–45)
BASOPHILS # BLD AUTO: 0.8 % (ref 0–1.8)
BASOPHILS # BLD: 0.04 K/UL (ref 0–0.12)
BILIRUB SERPL-MCNC: 0.3 MG/DL (ref 0.1–1.5)
BUN SERPL-MCNC: 20 MG/DL (ref 8–22)
CALCIUM SERPL-MCNC: 9 MG/DL (ref 8.5–10.5)
CHLORIDE SERPL-SCNC: 105 MMOL/L (ref 96–112)
CHOLEST SERPL-MCNC: 185 MG/DL (ref 100–199)
CO2 SERPL-SCNC: 22 MMOL/L (ref 20–33)
CREAT SERPL-MCNC: 0.9 MG/DL (ref 0.5–1.4)
EOSINOPHIL # BLD AUTO: 0.07 K/UL (ref 0–0.51)
EOSINOPHIL NFR BLD: 1.5 % (ref 0–6.9)
ERYTHROCYTE [DISTWIDTH] IN BLOOD BY AUTOMATED COUNT: 48.5 FL (ref 35.9–50)
FASTING STATUS PATIENT QL REPORTED: NORMAL
GFR SERPLBLD CREATININE-BSD FMLA CKD-EPI: 90 ML/MIN/1.73 M 2
GLOBULIN SER CALC-MCNC: 2.5 G/DL (ref 1.9–3.5)
GLUCOSE SERPL-MCNC: 92 MG/DL (ref 65–99)
HCT VFR BLD AUTO: 43.5 % (ref 42–52)
HDLC SERPL-MCNC: 47 MG/DL
HGB BLD-MCNC: 14.5 G/DL (ref 14–18)
IMM GRANULOCYTES # BLD AUTO: 0.01 K/UL (ref 0–0.11)
IMM GRANULOCYTES NFR BLD AUTO: 0.2 % (ref 0–0.9)
LDLC SERPL CALC-MCNC: 124 MG/DL
LYMPHOCYTES # BLD AUTO: 0.85 K/UL (ref 1–4.8)
LYMPHOCYTES NFR BLD: 18 % (ref 22–41)
MCH RBC QN AUTO: 32.6 PG (ref 27–33)
MCHC RBC AUTO-ENTMCNC: 33.3 G/DL (ref 33.7–35.3)
MCV RBC AUTO: 97.8 FL (ref 81.4–97.8)
MONOCYTES # BLD AUTO: 0.49 K/UL (ref 0–0.85)
MONOCYTES NFR BLD AUTO: 10.4 % (ref 0–13.4)
NEUTROPHILS # BLD AUTO: 3.26 K/UL (ref 1.82–7.42)
NEUTROPHILS NFR BLD: 69.1 % (ref 44–72)
NRBC # BLD AUTO: 0 K/UL
NRBC BLD-RTO: 0 /100 WBC
PLATELET # BLD AUTO: 219 K/UL (ref 164–446)
PMV BLD AUTO: 11.2 FL (ref 9–12.9)
POTASSIUM SERPL-SCNC: 4.8 MMOL/L (ref 3.6–5.5)
PROT SERPL-MCNC: 7.1 G/DL (ref 6–8.2)
PSA SERPL-MCNC: 15.4 NG/ML (ref 0–4)
RBC # BLD AUTO: 4.45 M/UL (ref 4.7–6.1)
SODIUM SERPL-SCNC: 138 MMOL/L (ref 135–145)
TRIGL SERPL-MCNC: 71 MG/DL (ref 0–149)
WBC # BLD AUTO: 4.7 K/UL (ref 4.8–10.8)

## 2022-07-23 PROCEDURE — 36415 COLL VENOUS BLD VENIPUNCTURE: CPT

## 2022-07-23 PROCEDURE — 80053 COMPREHEN METABOLIC PANEL: CPT

## 2022-07-23 PROCEDURE — 80061 LIPID PANEL: CPT

## 2022-07-23 PROCEDURE — 84153 ASSAY OF PSA TOTAL: CPT

## 2022-07-23 PROCEDURE — 85025 COMPLETE CBC W/AUTO DIFF WBC: CPT

## 2022-09-12 NOTE — PROGRESS NOTES
CC: Urinary frequency, bad breath, dyslipidemia.  Due for wellness examination    HPI:   Tato presents today with the following.      1. Medicare annual wellness visit, subsequent  Patient has advanced directives at home will bring in a copy.    2. Benign prostatic hyperplasia with urinary frequency  Complaining of increased urinary frequency waking up 3 times per night and needing to empty his bladder several times in the morning.  There is weakened stream this has been progressive with time.    3. Halitosis  Wife reports he has intermittent bad breath.  He does suffer from allergies taking medications with such as Benadryl.  He does not have any heartburn is not had any dental issues.    4. Dyslipidemia  Obtain on statin without myalgia coming due for recheck        Information for advance directives given to patient or instructed to bring in advance directives into to office to put in chart.      Depression Screening    Little interest or pleasure in doing things?  0 - not at all  Feeling down, depressed , or hopeless? 0 - not at all  Patient Health Questionnaire Score: 0     If depressive symptoms identified deferred to follow up visit unless specifically addressed in assessment and plan.    Interpretation of PHQ-9 Total Score   Score Severity   1-4 No Depression   5-9 Mild Depression   10-14 Moderate Depression   15-19 Moderately Severe Depression   20-27 Severe Depression    Screening for Cognitive Impairment    Three Minute Recall (village, kitchen, baby) 3/3    [place] clock face with all 12 numbers and set the hands to show 10 past 10.  Yes 5/5  Cognitive concerns identified deferred for follow up unless specifically addressed in assessment and plan.    Fall Risk Assessment    Has the patient had two or more falls in the last year or any fall with injury in the last year?  No    Safety Assessment    Throw rugs on floor.  No  Handrails on all stairs.  Yes  Good lighting in all hallways.  Yes  Difficulty  TRANSFER - OUT REPORT:    Verbal report given to Danitza Betancourt RN on [facility]  being transferred to  for ordered procedure       Report consisted of patients Situation, Background, Assessment and   Recommendations(SBAR). Information from the following report(s) Nurse Handoff Report was reviewed with the receiving nurse. Opportunity for questions and clarification was provided.       Patient transported with:   Monitor hearing.  Yes  Patient counseled about all safety risks that were identified.    Functional Assessment ADLs    Are there any barriers preventing you from cooking for yourself or meeting nutritional needs?  No.    Are there any barriers preventing you from driving safely or obtaining transportation?  No.    Are there any barriers preventing you from using a telephone or calling for help?  No.    Are there any barriers preventing you from shopping?  No.    Are there any barriers preventing you from taking care of your own finances?  No.    Are there any barriers preventing you from managing your medications?  No.    Are there any barriers preventing you from showering, bathing or dressing yourself?  No.    Are you currently engaging in any exercise or physical activity?  No.     What is your perception of your health?  Good.      Health Maintenance Summary                HEPATITIS C SCREENING Overdue 1950     IMM PNEUMOCOCCAL VACCINE: 65+ Years Overdue 3/31/2018      Done 3/31/2017 Imm Admin: Pneumococcal Conjugate Vaccine (Prevnar/PCV-13)    Annual Wellness Visit Overdue 4/13/2018      Done 4/12/2017     IMM ZOSTER VACCINES Postponed 1/11/2040 Originally 6/7/2017. System: vaccine not available, other system reasons     Done 4/12/2017 Imm Admin: Zoster Vaccine Live (ZVL) (Zostavax)    IMM INFLUENZA Next Due 9/1/2019      Done 10/2/2017 Imm Admin: Influenza Vaccine Adult HD     Patient has more history with this topic...    IMM DTaP/Tdap/Td Vaccine Next Due 2/28/2022      Done 2/29/2012 Imm Admin: Tdap Vaccine    COLONOSCOPY Next Due 3/12/2024      Done 3/12/2014           Patient Care Team:  Shravan Trent M.D. as PCP - General (Internal Medicine)  Ty Simons M.D. as Consulting Physician (Gastroenterology)  Sunita Jo M.D. as Consulting Physician (Dermatology)            Health Care Screening: Age-appropriate preventive services that Medicare covers were discussed today and ordered as indicated and  agreed upon by the patient, and as recommended by USPTF and ACIP.     Patient Active Problem List    Diagnosis Date Noted   • Benign prostatic hyperplasia with urinary frequency 08/08/2019   • Bilateral inguinal hernia without obstruction or gangrene 03/05/2018   • B12 deficiency 04/12/2017   • Dyslipidemia 03/31/2017   • History of skin cancer 03/31/2017   • Erectile dysfunction due to arterial insufficiency 03/31/2017       Current Outpatient Medications   Medication Sig Dispense Refill   • atorvastatin (LIPITOR) 40 MG Tab Take 1 Tab by mouth every day. 90 Tab 3   • tamsulosin (FLOMAX) 0.4 MG capsule Take 1 Cap by mouth ONE-HALF HOUR AFTER BREAKFAST. 90 Cap 3   • cyanocobalamin ([employer] B-12) 500 MCG tablet Take 1 Tab by mouth every day. 30 Tab 11   • Coenzyme Q10 (COQ10 PO) Take 75 mg by mouth every day.     • Multiple Vitamins-Minerals (PRESERVISION AREDS 2 PO) Take  by mouth every day.     • Evening Primrose Oil 500 MG CAPS Take  by mouth every day.     • acetaminophen (TYLENOL) 500 MG TABS Take 500-1,000 mg by mouth every 6 hours as needed.     • Psyllium (METAMUCIL PO) Take  by mouth every day.     • sildenafil citrate (VIAGRA) 100 MG tablet Take 1 Tab by mouth as needed for Erectile Dysfunction. (Patient not taking: Reported on 3/5/2018) 18 Tab 3     No current facility-administered medications for this visit.        Family History   Problem Relation Age of Onset   • Diabetes Unknown    • Heart Disease Unknown    • Hypertension Unknown    • Cancer Unknown    • Heart Disease Mother    • Heart Disease Father        Social History     Socioeconomic History   • Marital status:      Spouse name: Not on file   • Number of children: Not on file   • Years of education: Not on file   • Highest education level: Not on file   Occupational History   • Not on file   Social Needs   • Financial resource strain: Not on file   • Food insecurity:     Worry: Not on file     Inability: Not on file   • Transportation needs:      Medical: Not on file     Non-medical: Not on file   Tobacco Use   • Smoking status: Never Smoker   • Smokeless tobacco: Never Used   Substance and Sexual Activity   • Alcohol use: Yes     Comment: 5 per week   • Drug use: No   • Sexual activity: Yes     Partners: Female   Lifestyle   • Physical activity:     Days per week: Not on file     Minutes per session: Not on file   • Stress: Not on file   Relationships   • Social connections:     Talks on phone: Not on file     Gets together: Not on file     Attends Protestant service: Not on file     Active member of club or organization: Not on file     Attends meetings of clubs or organizations: Not on file     Relationship status: Not on file   • Intimate partner violence:     Fear of current or ex partner: Not on file     Emotionally abused: Not on file     Physically abused: Not on file     Forced sexual activity: Not on file   Other Topics Concern   • Not on file   Social History Narrative   • Not on file       Past Surgical History:   Procedure Laterality Date   • SHOULDER DECOMPRESSION ARTHROSCOPIC Right 8/4/2015    Procedure: SHOULDER DECOMPRESSION ARTHROSCOPIC LABRAL DEBRIDEMENT;  Surgeon: Abimbola Andino M.D.;  Location: [facility];  Service:    • SHOULDER ARTHROSCOPY W/ ROTATOR CUFF REPAIR Right 8/4/2015    Procedure: SHOULDER ARTHROSCOPY W/ ROTATOR CUFF REPAIR;  Surgeon: Abimbola Andino M.D.;  Location: [facility];  Service:    • CLAVICLE DISTAL EXCISION Right 8/4/2015    Procedure: CLAVICLE DISTAL EXCISION;  Surgeon: Abimbola Andino M.D.;  Location: [facility];  Service:    • COLONOSCOPY  7/2015   • OTHER SURGICAL PROCEDURE  2004    excision of basal cell skin cancer       Allergies as of 08/08/2019   • (No Known Allergies)        ROS: Denies Chest pain, SOB, LE edema.    /72 (BP Location: Left arm, Patient Position: Sitting)   Pulse 64   Temp 36.3 °C (97.4 °F)   Ht 1.829 m (6')   Wt 94.3 kg (208  lb)   SpO2 98%   BMI 28.21 kg/m²     Physical Exam:  Gen:         Alert and oriented, No apparent distress.  Neck:        No Lymphadenopathy or Bruits.  Lungs:     Clear to auscultation bilaterally  CV:          Regular rate and rhythm. No murmurs, rubs or gallops.  Abd:         Soft non tender, non distended. Normal active bowel sounds.  No  Hepatosplenomegaly, No pulsatile masses.                   Ext:          No clubbing, cyanosis, edema.      Assessment and Plan.   68 y.o. male with the following issues.    1. Medicare annual wellness visit, subsequent  Discussed healthy lifestyle habits as well as screening regimens.  - Subsequent Annual Wellness Visit - Includes PPPS ()    2. Benign prostatic hyperplasia with urinary frequency  Discussion about lifestyle modification if not effective placing on Flomax.  Also to discontinue Benadryl.  - tamsulosin (FLOMAX) 0.4 MG capsule; Take 1 Cap by mouth ONE-HALF HOUR AFTER BREAKFAST.  Dispense: 90 Cap; Refill: 3    3. Halitosis  Discussion about treating more aggressively for allergies    4. Dyslipidemia  Refilled statin rechecking blood work  - Comp Metabolic Panel; Future  - Lipid Profile; Future  - atorvastatin (LIPITOR) 40 MG Tab; Take 1 Tab by mouth every day.  Dispense: 90 Tab; Refill: 3    5. B12 deficiency  Checking B12  - Subsequent Annual Wellness Visit - Includes PPPS ()    6. Need for hepatitis C screening test  - HEP C VIRUS ANTIBODY; Future  - Subsequent Annual Wellness Visit - Includes PPPS ()    7. Screening PSA (prostate specific antigen)    - PROSTATE SPECIFIC AG SCREENING; Future  - VITAMIN B12; Future  - Subsequent Annual Wellness Visit - Includes PPPS ()    8. Need for vaccination    - Pneumococal Polysaccharide Vaccine 23-Valent =>3YO SQ/IM        Referrals offered: Community-based lifestyle interventions to reduce health risks and promote self-management and wellness, fall prevention, nutrition, physical activity, tobacco-use  cessation, weight loss, and mental health services as per orders if indicated.    Discussion today about general wellness and lifestyle habits:    · Prevent falls and reduce trip hazards; Cautioned about securing or removing rugs.  · Have a working fire alarm and carbon monoxide detector;   · Engage in regular physical activity and social activities

## 2022-10-04 ENCOUNTER — HOSPITAL ENCOUNTER (OUTPATIENT)
Facility: MEDICAL CENTER | Age: 72
End: 2022-10-04
Attending: PHYSICIAN ASSISTANT
Payer: MEDICARE

## 2022-10-04 PROCEDURE — 87086 URINE CULTURE/COLONY COUNT: CPT

## 2022-10-07 LAB
BACTERIA UR CULT: NORMAL
SIGNIFICANT IND 70042: NORMAL
SITE SITE: NORMAL
SOURCE SOURCE: NORMAL

## 2022-10-10 ENCOUNTER — HOSPITAL ENCOUNTER (OUTPATIENT)
Dept: LAB | Facility: MEDICAL CENTER | Age: 72
End: 2022-10-10
Attending: FAMILY MEDICINE
Payer: MEDICARE

## 2022-10-10 LAB
ALBUMIN SERPL BCP-MCNC: 4.5 G/DL (ref 3.2–4.9)
ALBUMIN/GLOB SERPL: 2 G/DL
ALP SERPL-CCNC: 101 U/L (ref 30–99)
ALT SERPL-CCNC: 17 U/L (ref 2–50)
ANION GAP SERPL CALC-SCNC: 11 MMOL/L (ref 7–16)
AST SERPL-CCNC: 20 U/L (ref 12–45)
BASOPHILS # BLD AUTO: 0.7 % (ref 0–1.8)
BASOPHILS # BLD: 0.03 K/UL (ref 0–0.12)
BILIRUB SERPL-MCNC: 0.7 MG/DL (ref 0.1–1.5)
BUN SERPL-MCNC: 22 MG/DL (ref 8–22)
CALCIUM SERPL-MCNC: 9.2 MG/DL (ref 8.5–10.5)
CHLORIDE SERPL-SCNC: 102 MMOL/L (ref 96–112)
CHOLEST SERPL-MCNC: 229 MG/DL (ref 100–199)
CO2 SERPL-SCNC: 24 MMOL/L (ref 20–33)
CREAT SERPL-MCNC: 0.84 MG/DL (ref 0.5–1.4)
EOSINOPHIL # BLD AUTO: 0.09 K/UL (ref 0–0.51)
EOSINOPHIL NFR BLD: 2.2 % (ref 0–6.9)
ERYTHROCYTE [DISTWIDTH] IN BLOOD BY AUTOMATED COUNT: 48.2 FL (ref 35.9–50)
FASTING STATUS PATIENT QL REPORTED: NORMAL
GFR SERPLBLD CREATININE-BSD FMLA CKD-EPI: 92 ML/MIN/1.73 M 2
GLOBULIN SER CALC-MCNC: 2.3 G/DL (ref 1.9–3.5)
GLUCOSE SERPL-MCNC: 91 MG/DL (ref 65–99)
HCT VFR BLD AUTO: 43.5 % (ref 42–52)
HDLC SERPL-MCNC: 49 MG/DL
HGB BLD-MCNC: 14.7 G/DL (ref 14–18)
IMM GRANULOCYTES # BLD AUTO: 0.01 K/UL (ref 0–0.11)
IMM GRANULOCYTES NFR BLD AUTO: 0.2 % (ref 0–0.9)
LDLC SERPL CALC-MCNC: 146 MG/DL
LYMPHOCYTES # BLD AUTO: 0.88 K/UL (ref 1–4.8)
LYMPHOCYTES NFR BLD: 21.2 % (ref 22–41)
MCH RBC QN AUTO: 32.7 PG (ref 27–33)
MCHC RBC AUTO-ENTMCNC: 33.8 G/DL (ref 33.7–35.3)
MCV RBC AUTO: 96.9 FL (ref 81.4–97.8)
MONOCYTES # BLD AUTO: 0.52 K/UL (ref 0–0.85)
MONOCYTES NFR BLD AUTO: 12.5 % (ref 0–13.4)
NEUTROPHILS # BLD AUTO: 2.62 K/UL (ref 1.82–7.42)
NEUTROPHILS NFR BLD: 63.2 % (ref 44–72)
NRBC # BLD AUTO: 0 K/UL
NRBC BLD-RTO: 0 /100 WBC
PLATELET # BLD AUTO: 200 K/UL (ref 164–446)
PMV BLD AUTO: 11.8 FL (ref 9–12.9)
POTASSIUM SERPL-SCNC: 4.3 MMOL/L (ref 3.6–5.5)
PROT SERPL-MCNC: 6.8 G/DL (ref 6–8.2)
RBC # BLD AUTO: 4.49 M/UL (ref 4.7–6.1)
SODIUM SERPL-SCNC: 137 MMOL/L (ref 135–145)
TRIGL SERPL-MCNC: 169 MG/DL (ref 0–149)
WBC # BLD AUTO: 4.2 K/UL (ref 4.8–10.8)

## 2022-10-10 PROCEDURE — 80061 LIPID PANEL: CPT

## 2022-10-10 PROCEDURE — 36415 COLL VENOUS BLD VENIPUNCTURE: CPT

## 2022-10-10 PROCEDURE — 80053 COMPREHEN METABOLIC PANEL: CPT

## 2022-10-10 PROCEDURE — 85025 COMPLETE CBC W/AUTO DIFF WBC: CPT

## 2022-11-02 ENCOUNTER — PATIENT MESSAGE (OUTPATIENT)
Dept: HEALTH INFORMATION MANAGEMENT | Facility: OTHER | Age: 72
End: 2022-11-02

## 2022-11-14 ENCOUNTER — HOSPITAL ENCOUNTER (OUTPATIENT)
Dept: LAB | Facility: MEDICAL CENTER | Age: 72
End: 2022-11-14
Attending: RADIOLOGY
Payer: MEDICARE

## 2022-11-14 LAB — PSA SERPL-MCNC: 4.85 NG/ML (ref 0–4)

## 2022-11-14 PROCEDURE — 36415 COLL VENOUS BLD VENIPUNCTURE: CPT

## 2022-11-14 PROCEDURE — 84153 ASSAY OF PSA TOTAL: CPT

## 2023-01-01 ENCOUNTER — OFFICE VISIT (OUTPATIENT)
Dept: URGENT CARE | Facility: PHYSICIAN GROUP | Age: 73
End: 2023-01-01
Payer: MEDICARE

## 2023-01-01 VITALS
HEIGHT: 72 IN | WEIGHT: 200 LBS | TEMPERATURE: 98.7 F | DIASTOLIC BLOOD PRESSURE: 78 MMHG | RESPIRATION RATE: 12 BRPM | SYSTOLIC BLOOD PRESSURE: 130 MMHG | OXYGEN SATURATION: 98 % | HEART RATE: 78 BPM | BODY MASS INDEX: 27.09 KG/M2

## 2023-01-01 DIAGNOSIS — R05.1 ACUTE COUGH: ICD-10-CM

## 2023-01-01 DIAGNOSIS — R53.83 OTHER FATIGUE: ICD-10-CM

## 2023-01-01 DIAGNOSIS — U07.1 COVID-19: ICD-10-CM

## 2023-01-01 LAB
EXTERNAL QUALITY CONTROL: ABNORMAL
INT CON NEG: NEGATIVE
INT CON POS: POSITIVE
SARS-COV+SARS-COV-2 AG RESP QL IA.RAPID: POSITIVE

## 2023-01-01 PROCEDURE — 87426 SARSCOV CORONAVIRUS AG IA: CPT | Mod: CS | Performed by: FAMILY MEDICINE

## 2023-01-01 PROCEDURE — 99203 OFFICE O/P NEW LOW 30 MIN: CPT | Mod: CS | Performed by: FAMILY MEDICINE

## 2023-01-01 RX ORDER — BENZONATATE 200 MG/1
200 CAPSULE ORAL 3 TIMES DAILY PRN
Qty: 30 CAPSULE | Refills: 0 | Status: SHIPPED | OUTPATIENT
Start: 2023-01-01

## 2023-01-01 ASSESSMENT — ENCOUNTER SYMPTOMS
NAUSEA: 0
WEIGHT LOSS: 0
MYALGIAS: 0
DIARRHEA: 0
EYE REDNESS: 0
VOMITING: 0
EYE DISCHARGE: 0

## 2023-01-01 ASSESSMENT — FIBROSIS 4 INDEX: FIB4 SCORE: 1.79

## 2023-01-01 NOTE — PROGRESS NOTES
Subjective     Cody Benjamin is a 72 y.o. male who presents with cough          Onset yesterday morning with scratchy throat. Subsequent chills and cough. Cough is productive without blood in sputum. No SOB/wheeze. No PMH asthma/pneumonia. Night sweats. [facility]. Fatigue.  home C19 test was positive. No other aggravating or alleviating factors.        Review of Systems   Constitutional:  Negative for weight loss.   Eyes:  Negative for discharge and redness.   Gastrointestinal:  Negative for diarrhea, nausea and vomiting.   Musculoskeletal:  Negative for joint pain and myalgias.   Skin:  Negative for itching and rash.            Objective   /78   Pulse 78   Temp 37.1 °C (98.7 °F) (Temporal)   Resp 12   Ht 1.829 m (6')   Wt 90.7 kg (200 lb)   SpO2 98%   BMI 27.12 kg/m²     Physical Exam  Constitutional:       General: He is not in acute distress.     Appearance: He is well-developed.   HENT:      Head: Normocephalic and atraumatic.      Right Ear: Tympanic membrane normal.      Nose: Congestion present.      Mouth/Throat:      Mouth: Mucous membranes are moist.      Pharynx: No posterior oropharyngeal erythema.   Eyes:      Conjunctiva/sclera: Conjunctivae normal.   Cardiovascular:      Rate and Rhythm: Normal rate and regular rhythm.      Heart sounds: Normal heart sounds. No murmur heard.  Pulmonary:      Effort: Pulmonary effort is normal.      Breath sounds: Normal breath sounds. No wheezing.   Skin:     General: Skin is warm and dry.      Findings: No rash.   Neurological:      Mental Status: He is alert.                           Assessment & Plan      Poct covid +    1. Acute cough  POCT SARS-COV Antigen LUPE (Symptomatic only)    benzonatate (TESSALON) 200 MG capsule      2. Other fatigue  POCT SARS-COV Antigen LUPE (Symptomatic only)      3. COVID-19  Nirmatrelvir&Ritonavir 300/100 20 x 150 MG & 10 x 100MG Tablet Therapy Pack        Differential diagnosis, natural history, supportive  care, and indications for immediate follow-up discussed at length.     Hold statin 10 days

## 2023-01-23 ENCOUNTER — HOSPITAL ENCOUNTER (OUTPATIENT)
Dept: LAB | Facility: MEDICAL CENTER | Age: 73
End: 2023-01-23
Attending: FAMILY MEDICINE
Payer: MEDICARE

## 2023-01-23 LAB
ALBUMIN SERPL BCP-MCNC: 4.3 G/DL (ref 3.2–4.9)
ALBUMIN/GLOB SERPL: 1.7 G/DL
ALP SERPL-CCNC: 96 U/L (ref 30–99)
ALT SERPL-CCNC: 37 U/L (ref 2–50)
ANION GAP SERPL CALC-SCNC: 9 MMOL/L (ref 7–16)
APPEARANCE UR: CLEAR
AST SERPL-CCNC: 25 U/L (ref 12–45)
BASOPHILS # BLD AUTO: 0.7 % (ref 0–1.8)
BASOPHILS # BLD: 0.03 K/UL (ref 0–0.12)
BILIRUB SERPL-MCNC: 0.5 MG/DL (ref 0.1–1.5)
BILIRUB UR QL STRIP.AUTO: NEGATIVE
BUN SERPL-MCNC: 23 MG/DL (ref 8–22)
CALCIUM ALBUM COR SERPL-MCNC: 9 MG/DL (ref 8.5–10.5)
CALCIUM SERPL-MCNC: 9.2 MG/DL (ref 8.5–10.5)
CHLORIDE SERPL-SCNC: 103 MMOL/L (ref 96–112)
CHOLEST SERPL-MCNC: 190 MG/DL (ref 100–199)
CO2 SERPL-SCNC: 25 MMOL/L (ref 20–33)
COLOR UR: YELLOW
CREAT SERPL-MCNC: 0.86 MG/DL (ref 0.5–1.4)
EOSINOPHIL # BLD AUTO: 0.05 K/UL (ref 0–0.51)
EOSINOPHIL NFR BLD: 1.2 % (ref 0–6.9)
ERYTHROCYTE [DISTWIDTH] IN BLOOD BY AUTOMATED COUNT: 45.8 FL (ref 35.9–50)
ERYTHROCYTE [SEDIMENTATION RATE] IN BLOOD BY WESTERGREN METHOD: 16 MM/HOUR (ref 0–20)
FASTING STATUS PATIENT QL REPORTED: NORMAL
GFR SERPLBLD CREATININE-BSD FMLA CKD-EPI: 91 ML/MIN/1.73 M 2
GLOBULIN SER CALC-MCNC: 2.6 G/DL (ref 1.9–3.5)
GLUCOSE SERPL-MCNC: 102 MG/DL (ref 65–99)
GLUCOSE UR STRIP.AUTO-MCNC: NEGATIVE MG/DL
HCT VFR BLD AUTO: 43.1 % (ref 42–52)
HDLC SERPL-MCNC: 39 MG/DL
HGB BLD-MCNC: 14.4 G/DL (ref 14–18)
IMM GRANULOCYTES # BLD AUTO: 0.01 K/UL (ref 0–0.11)
IMM GRANULOCYTES NFR BLD AUTO: 0.2 % (ref 0–0.9)
KETONES UR STRIP.AUTO-MCNC: NEGATIVE MG/DL
LDLC SERPL CALC-MCNC: 129 MG/DL
LEUKOCYTE ESTERASE UR QL STRIP.AUTO: NEGATIVE
LYMPHOCYTES # BLD AUTO: 0.87 K/UL (ref 1–4.8)
LYMPHOCYTES NFR BLD: 21 % (ref 22–41)
MCH RBC QN AUTO: 31.9 PG (ref 27–33)
MCHC RBC AUTO-ENTMCNC: 33.4 G/DL (ref 33.7–35.3)
MCV RBC AUTO: 95.4 FL (ref 81.4–97.8)
MICRO URNS: NORMAL
MONOCYTES # BLD AUTO: 0.43 K/UL (ref 0–0.85)
MONOCYTES NFR BLD AUTO: 10.4 % (ref 0–13.4)
NEUTROPHILS # BLD AUTO: 2.76 K/UL (ref 1.82–7.42)
NEUTROPHILS NFR BLD: 66.5 % (ref 44–72)
NITRITE UR QL STRIP.AUTO: NEGATIVE
NRBC # BLD AUTO: 0 K/UL
NRBC BLD-RTO: 0 /100 WBC
PH UR STRIP.AUTO: 6.5 [PH] (ref 5–8)
PLATELET # BLD AUTO: 267 K/UL (ref 164–446)
PMV BLD AUTO: 11 FL (ref 9–12.9)
POTASSIUM SERPL-SCNC: 4.5 MMOL/L (ref 3.6–5.5)
PROT SERPL-MCNC: 6.9 G/DL (ref 6–8.2)
PROT UR QL STRIP: NEGATIVE MG/DL
RBC # BLD AUTO: 4.52 M/UL (ref 4.7–6.1)
RBC UR QL AUTO: NEGATIVE
SODIUM SERPL-SCNC: 137 MMOL/L (ref 135–145)
SP GR UR STRIP.AUTO: 1.02
TRIGL SERPL-MCNC: 109 MG/DL (ref 0–149)
UROBILINOGEN UR STRIP.AUTO-MCNC: 0.2 MG/DL
WBC # BLD AUTO: 4.2 K/UL (ref 4.8–10.8)

## 2023-01-23 PROCEDURE — 36415 COLL VENOUS BLD VENIPUNCTURE: CPT

## 2023-01-23 PROCEDURE — 80053 COMPREHEN METABOLIC PANEL: CPT

## 2023-01-23 PROCEDURE — 85025 COMPLETE CBC W/AUTO DIFF WBC: CPT

## 2023-01-23 PROCEDURE — 85652 RBC SED RATE AUTOMATED: CPT

## 2023-01-23 PROCEDURE — 81003 URINALYSIS AUTO W/O SCOPE: CPT

## 2023-01-23 PROCEDURE — 80061 LIPID PANEL: CPT

## 2023-02-07 ENCOUNTER — HOSPITAL ENCOUNTER (OUTPATIENT)
Dept: LAB | Facility: MEDICAL CENTER | Age: 73
End: 2023-02-07
Attending: RADIOLOGY
Payer: MEDICARE

## 2023-02-07 LAB — PSA SERPL-MCNC: 3.74 NG/ML (ref 0–4)

## 2023-02-07 PROCEDURE — 36415 COLL VENOUS BLD VENIPUNCTURE: CPT

## 2023-02-07 PROCEDURE — 84153 ASSAY OF PSA TOTAL: CPT

## 2023-05-02 ENCOUNTER — HOSPITAL ENCOUNTER (OUTPATIENT)
Dept: LAB | Facility: MEDICAL CENTER | Age: 73
End: 2023-05-02
Attending: FAMILY MEDICINE
Payer: MEDICARE

## 2023-05-02 LAB
ALBUMIN SERPL BCP-MCNC: 4.2 G/DL (ref 3.2–4.9)
ALBUMIN/GLOB SERPL: 1.7 G/DL
ALP SERPL-CCNC: 96 U/L (ref 30–99)
ALT SERPL-CCNC: 14 U/L (ref 2–50)
ANION GAP SERPL CALC-SCNC: 15 MMOL/L (ref 7–16)
APPEARANCE UR: CLEAR
AST SERPL-CCNC: 18 U/L (ref 12–45)
BASOPHILS # BLD AUTO: 0.7 % (ref 0–1.8)
BASOPHILS # BLD: 0.03 K/UL (ref 0–0.12)
BILIRUB SERPL-MCNC: 0.5 MG/DL (ref 0.1–1.5)
BILIRUB UR QL STRIP.AUTO: NEGATIVE
BUN SERPL-MCNC: 17 MG/DL (ref 8–22)
CALCIUM ALBUM COR SERPL-MCNC: 8.7 MG/DL (ref 8.5–10.5)
CALCIUM SERPL-MCNC: 8.9 MG/DL (ref 8.5–10.5)
CHLORIDE SERPL-SCNC: 104 MMOL/L (ref 96–112)
CHOLEST SERPL-MCNC: 178 MG/DL (ref 100–199)
CO2 SERPL-SCNC: 22 MMOL/L (ref 20–33)
COLOR UR: YELLOW
CREAT SERPL-MCNC: 0.88 MG/DL (ref 0.5–1.4)
EOSINOPHIL # BLD AUTO: 0.1 K/UL (ref 0–0.51)
EOSINOPHIL NFR BLD: 2.2 % (ref 0–6.9)
ERYTHROCYTE [DISTWIDTH] IN BLOOD BY AUTOMATED COUNT: 46.8 FL (ref 35.9–50)
ERYTHROCYTE [SEDIMENTATION RATE] IN BLOOD BY WESTERGREN METHOD: 9 MM/HOUR (ref 0–20)
FASTING STATUS PATIENT QL REPORTED: NORMAL
GFR SERPLBLD CREATININE-BSD FMLA CKD-EPI: 90 ML/MIN/1.73 M 2
GLOBULIN SER CALC-MCNC: 2.5 G/DL (ref 1.9–3.5)
GLUCOSE SERPL-MCNC: 97 MG/DL (ref 65–99)
GLUCOSE UR STRIP.AUTO-MCNC: NEGATIVE MG/DL
HCT VFR BLD AUTO: 44.2 % (ref 42–52)
HDLC SERPL-MCNC: 42 MG/DL
HGB BLD-MCNC: 14.4 G/DL (ref 14–18)
IMM GRANULOCYTES # BLD AUTO: 0.01 K/UL (ref 0–0.11)
IMM GRANULOCYTES NFR BLD AUTO: 0.2 % (ref 0–0.9)
KETONES UR STRIP.AUTO-MCNC: NEGATIVE MG/DL
LDLC SERPL CALC-MCNC: 115 MG/DL
LEUKOCYTE ESTERASE UR QL STRIP.AUTO: NEGATIVE
LYMPHOCYTES # BLD AUTO: 1.08 K/UL (ref 1–4.8)
LYMPHOCYTES NFR BLD: 23.8 % (ref 22–41)
MCH RBC QN AUTO: 31.5 PG (ref 27–33)
MCHC RBC AUTO-ENTMCNC: 32.6 G/DL (ref 33.7–35.3)
MCV RBC AUTO: 96.7 FL (ref 81.4–97.8)
MICRO URNS: NORMAL
MONOCYTES # BLD AUTO: 0.45 K/UL (ref 0–0.85)
MONOCYTES NFR BLD AUTO: 9.9 % (ref 0–13.4)
NEUTROPHILS # BLD AUTO: 2.87 K/UL (ref 1.82–7.42)
NEUTROPHILS NFR BLD: 63.2 % (ref 44–72)
NITRITE UR QL STRIP.AUTO: NEGATIVE
NRBC # BLD AUTO: 0 K/UL
NRBC BLD-RTO: 0 /100 WBC
PH UR STRIP.AUTO: 7.5 [PH] (ref 5–8)
PLATELET # BLD AUTO: 202 K/UL (ref 164–446)
PMV BLD AUTO: 11.6 FL (ref 9–12.9)
POTASSIUM SERPL-SCNC: 4.6 MMOL/L (ref 3.6–5.5)
PROT SERPL-MCNC: 6.7 G/DL (ref 6–8.2)
PROT UR QL STRIP: NEGATIVE MG/DL
RBC # BLD AUTO: 4.57 M/UL (ref 4.7–6.1)
RBC UR QL AUTO: NEGATIVE
SODIUM SERPL-SCNC: 141 MMOL/L (ref 135–145)
SP GR UR STRIP.AUTO: 1.01
TRIGL SERPL-MCNC: 106 MG/DL (ref 0–149)
UROBILINOGEN UR STRIP.AUTO-MCNC: 0.2 MG/DL
WBC # BLD AUTO: 4.5 K/UL (ref 4.8–10.8)

## 2023-05-02 PROCEDURE — 80061 LIPID PANEL: CPT

## 2023-05-02 PROCEDURE — 36415 COLL VENOUS BLD VENIPUNCTURE: CPT

## 2023-05-02 PROCEDURE — 80053 COMPREHEN METABOLIC PANEL: CPT

## 2023-05-02 PROCEDURE — 81003 URINALYSIS AUTO W/O SCOPE: CPT

## 2023-05-02 PROCEDURE — 85652 RBC SED RATE AUTOMATED: CPT

## 2023-05-02 PROCEDURE — 85025 COMPLETE CBC W/AUTO DIFF WBC: CPT

## 2023-05-24 ENCOUNTER — HOSPITAL ENCOUNTER (OUTPATIENT)
Dept: LAB | Facility: MEDICAL CENTER | Age: 73
End: 2023-05-24
Attending: UROLOGY
Payer: MEDICARE

## 2023-05-24 PROCEDURE — 36415 COLL VENOUS BLD VENIPUNCTURE: CPT

## 2023-05-24 PROCEDURE — 84153 ASSAY OF PSA TOTAL: CPT

## 2023-05-25 LAB — PSA SERPL-MCNC: 2.66 NG/ML (ref 0–4)

## 2023-08-14 ENCOUNTER — HOSPITAL ENCOUNTER (OUTPATIENT)
Dept: LAB | Facility: MEDICAL CENTER | Age: 73
End: 2023-08-14
Attending: FAMILY MEDICINE
Payer: MEDICARE

## 2023-08-14 LAB
ALBUMIN SERPL BCP-MCNC: 4.3 G/DL (ref 3.2–4.9)
ALBUMIN/GLOB SERPL: 1.7 G/DL
ALP SERPL-CCNC: 103 U/L (ref 30–99)
ALT SERPL-CCNC: 16 U/L (ref 2–50)
ANION GAP SERPL CALC-SCNC: 11 MMOL/L (ref 7–16)
APPEARANCE UR: CLEAR
AST SERPL-CCNC: 21 U/L (ref 12–45)
BACTERIA #/AREA URNS HPF: NEGATIVE /HPF
BASOPHILS # BLD AUTO: 0.5 % (ref 0–1.8)
BASOPHILS # BLD: 0.02 K/UL (ref 0–0.12)
BILIRUB SERPL-MCNC: 0.3 MG/DL (ref 0.1–1.5)
BILIRUB UR QL STRIP.AUTO: NEGATIVE
BUN SERPL-MCNC: 22 MG/DL (ref 8–22)
CALCIUM ALBUM COR SERPL-MCNC: 8.7 MG/DL (ref 8.5–10.5)
CALCIUM SERPL-MCNC: 8.9 MG/DL (ref 8.5–10.5)
CHLORIDE SERPL-SCNC: 103 MMOL/L (ref 96–112)
CHOLEST SERPL-MCNC: 172 MG/DL (ref 100–199)
CO2 SERPL-SCNC: 23 MMOL/L (ref 20–33)
COLOR UR: YELLOW
CREAT SERPL-MCNC: 0.96 MG/DL (ref 0.5–1.4)
EOSINOPHIL # BLD AUTO: 0.06 K/UL (ref 0–0.51)
EOSINOPHIL NFR BLD: 1.5 % (ref 0–6.9)
EPI CELLS #/AREA URNS HPF: NEGATIVE /HPF
ERYTHROCYTE [DISTWIDTH] IN BLOOD BY AUTOMATED COUNT: 48.6 FL (ref 35.9–50)
ERYTHROCYTE [SEDIMENTATION RATE] IN BLOOD BY WESTERGREN METHOD: 8 MM/HOUR (ref 0–20)
FASTING STATUS PATIENT QL REPORTED: NORMAL
GFR SERPLBLD CREATININE-BSD FMLA CKD-EPI: 83 ML/MIN/1.73 M 2
GLOBULIN SER CALC-MCNC: 2.6 G/DL (ref 1.9–3.5)
GLUCOSE SERPL-MCNC: 104 MG/DL (ref 65–99)
GLUCOSE UR STRIP.AUTO-MCNC: NEGATIVE MG/DL
HCT VFR BLD AUTO: 44.1 % (ref 42–52)
HDLC SERPL-MCNC: 40 MG/DL
HGB BLD-MCNC: 14.6 G/DL (ref 14–18)
HYALINE CASTS #/AREA URNS LPF: ABNORMAL /LPF
IMM GRANULOCYTES # BLD AUTO: 0.01 K/UL (ref 0–0.11)
IMM GRANULOCYTES NFR BLD AUTO: 0.2 % (ref 0–0.9)
KETONES UR STRIP.AUTO-MCNC: ABNORMAL MG/DL
LDLC SERPL CALC-MCNC: 111 MG/DL
LEUKOCYTE ESTERASE UR QL STRIP.AUTO: NEGATIVE
LYMPHOCYTES # BLD AUTO: 0.86 K/UL (ref 1–4.8)
LYMPHOCYTES NFR BLD: 21.2 % (ref 22–41)
MCH RBC QN AUTO: 32.1 PG (ref 27–33)
MCHC RBC AUTO-ENTMCNC: 33.1 G/DL (ref 32.3–36.5)
MCV RBC AUTO: 96.9 FL (ref 81.4–97.8)
MICRO URNS: ABNORMAL
MONOCYTES # BLD AUTO: 0.66 K/UL (ref 0–0.85)
MONOCYTES NFR BLD AUTO: 16.3 % (ref 0–13.4)
MUCOUS THREADS #/AREA URNS HPF: ABNORMAL /HPF
NEUTROPHILS # BLD AUTO: 2.45 K/UL (ref 1.82–7.42)
NEUTROPHILS NFR BLD: 60.3 % (ref 44–72)
NITRITE UR QL STRIP.AUTO: NEGATIVE
NRBC # BLD AUTO: 0 K/UL
NRBC BLD-RTO: 0 /100 WBC (ref 0–0.2)
PH UR STRIP.AUTO: 7 [PH] (ref 5–8)
PLATELET # BLD AUTO: 187 K/UL (ref 164–446)
PMV BLD AUTO: 11.4 FL (ref 9–12.9)
POTASSIUM SERPL-SCNC: 4.4 MMOL/L (ref 3.6–5.5)
PROT SERPL-MCNC: 6.9 G/DL (ref 6–8.2)
PROT UR QL STRIP: 30 MG/DL
RBC # BLD AUTO: 4.55 M/UL (ref 4.7–6.1)
RBC # URNS HPF: ABNORMAL /HPF
RBC UR QL AUTO: NEGATIVE
SODIUM SERPL-SCNC: 137 MMOL/L (ref 135–145)
SP GR UR STRIP.AUTO: 1.03
TRIGL SERPL-MCNC: 107 MG/DL (ref 0–149)
UROBILINOGEN UR STRIP.AUTO-MCNC: 1 MG/DL
WBC # BLD AUTO: 4.1 K/UL (ref 4.8–10.8)
WBC #/AREA URNS HPF: ABNORMAL /HPF

## 2023-08-14 PROCEDURE — 80061 LIPID PANEL: CPT

## 2023-08-14 PROCEDURE — 80053 COMPREHEN METABOLIC PANEL: CPT

## 2023-08-14 PROCEDURE — 85025 COMPLETE CBC W/AUTO DIFF WBC: CPT

## 2023-08-14 PROCEDURE — 85652 RBC SED RATE AUTOMATED: CPT

## 2023-08-14 PROCEDURE — 81001 URINALYSIS AUTO W/SCOPE: CPT

## 2023-08-14 PROCEDURE — 36415 COLL VENOUS BLD VENIPUNCTURE: CPT

## 2023-09-23 ENCOUNTER — HOSPITAL ENCOUNTER (OUTPATIENT)
Dept: LAB | Facility: MEDICAL CENTER | Age: 73
End: 2023-09-23
Attending: RADIOLOGY
Payer: COMMERCIAL

## 2023-09-25 ENCOUNTER — HOSPITAL ENCOUNTER (OUTPATIENT)
Dept: LAB | Facility: MEDICAL CENTER | Age: 73
End: 2023-09-25
Attending: RADIOLOGY
Payer: MEDICARE

## 2023-09-25 LAB — PSA SERPL-MCNC: 2.71 NG/ML (ref 0–4)

## 2023-09-25 PROCEDURE — 84153 ASSAY OF PSA TOTAL: CPT

## 2023-09-25 PROCEDURE — 36415 COLL VENOUS BLD VENIPUNCTURE: CPT

## (undated) DEVICE — SET LEADWIRE 5 LEAD BEDSIDE DISPOSABLE ECG (1SET OF 5/EA)

## (undated) DEVICE — SLEEVE VASO CALF MED - (10PR/CA)

## (undated) DEVICE — TOWEL STOP TIMEOUT SAFETY FLAG (40EA/CA)

## (undated) DEVICE — LACTATED RINGERS INJ 1000 ML - (14EA/CA 60CA/PF)

## (undated) DEVICE — SYRINGE 30 ML LL (56/BX)

## (undated) DEVICE — KIT ANESTHESIA W/CIRCUIT & 3/LT BAG W/FILTER (20EA/CA)

## (undated) DEVICE — CANISTER SUCTION 3000ML MECHANICAL FILTER AUTO SHUTOFF MEDI-VAC NONSTERILE LF DISP  (40EA/CA)

## (undated) DEVICE — EVACUATOR BLADDER ELLIK - (10/BX)

## (undated) DEVICE — BAG DRAINAGE ANTIREFLUX TOWER SLIDE TAP 4000 ML (20EA/CA)

## (undated) DEVICE — HEAD HOLDER JUNIOR/ADULT

## (undated) DEVICE — SET EXTENSION WITH 2 PORTS (48EA/CA) ***PART #2C8610 IS A SUBSTITUTE*****

## (undated) DEVICE — SPONGE GAUZESTER 4 X 4 4PLY - (128PK/CA)

## (undated) DEVICE — SUCTION INSTRUMENT YANKAUER BULBOUS TIP W/O VENT (50EA/CA)

## (undated) DEVICE — SODIUM CHL. IRRIGATION 0.9% 3000ML (4EA/CA 65CA/PF)

## (undated) DEVICE — ELECTRODE BIPOLAR COAGULATION BALL YELLOW 24 FR (6EA/PK)

## (undated) DEVICE — ELECTRODE DUAL RETURN W/ CORD - (50/PK)

## (undated) DEVICE — NEPTUNE 4 PORT MANIFOLD - (20/PK)

## (undated) DEVICE — GLOVE BIOGEL SZ 7.5 SURGICAL PF LTX - (50PR/BX 4BX/CA)

## (undated) DEVICE — CONNECTOR HOSE NEPTUNE FOR CYSTO ROOM

## (undated) DEVICE — SENSOR SPO2 NEO LNCS ADHESIVE (20/BX) SEE USER NOTES

## (undated) DEVICE — TUBING CLEARLINK DUO-VENT - C-FLO (48EA/CA)

## (undated) DEVICE — PROTECTOR ULNA NERVE - (36PR/CA)

## (undated) DEVICE — GOWN WARMING STANDARD FLEX - (30/CA)

## (undated) DEVICE — TOWELS CLOTH SURGICAL - (4/PK 20PK/CA)

## (undated) DEVICE — ELECTRODE BIPOLAR REGULAR CUTTING LOOP URO 24/26 FR (6EA/PK)

## (undated) DEVICE — TUBE E-T HI-LO CUFF 7.5MM (10EA/PK)

## (undated) DEVICE — MASK ANESTHESIA ADULT  - (100/CA)

## (undated) DEVICE — CATHETER FOLEY 22 FR 30CC  - (12EA/CA)

## (undated) DEVICE — CONTAINER SPECIMEN BAG OR - STERILE 4 OZ W/LID (100EA/CA)

## (undated) DEVICE — PACK SINGLE BASIN - (6/CA)

## (undated) DEVICE — ELECTRODE 850 FOAM ADHESIVE - HYDROGEL RADIOTRNSPRNT (50/PK)

## (undated) DEVICE — GLOVE BIOGEL ECLIPSE  PF LATEX SIZE 6.5 (50PR/BX)